# Patient Record
Sex: FEMALE | Race: WHITE | HISPANIC OR LATINO | Employment: FULL TIME | URBAN - METROPOLITAN AREA
[De-identification: names, ages, dates, MRNs, and addresses within clinical notes are randomized per-mention and may not be internally consistent; named-entity substitution may affect disease eponyms.]

---

## 2017-05-08 PROCEDURE — 99284 EMERGENCY DEPT VISIT MOD MDM: CPT

## 2017-05-08 ASSESSMENT — PAIN SCALES - GENERAL: PAINLEVEL_OUTOF10: 4

## 2017-05-09 ENCOUNTER — HOSPITAL ENCOUNTER (EMERGENCY)
Facility: MEDICAL CENTER | Age: 47
End: 2017-05-09
Attending: EMERGENCY MEDICINE
Payer: COMMERCIAL

## 2017-05-09 VITALS
OXYGEN SATURATION: 92 % | WEIGHT: 236.33 LBS | TEMPERATURE: 97.3 F | RESPIRATION RATE: 18 BRPM | SYSTOLIC BLOOD PRESSURE: 147 MMHG | HEIGHT: 67 IN | BODY MASS INDEX: 37.09 KG/M2 | HEART RATE: 90 BPM | DIASTOLIC BLOOD PRESSURE: 90 MMHG

## 2017-05-09 DIAGNOSIS — R07.89 ACUTE CHEST WALL PAIN: ICD-10-CM

## 2017-05-09 DIAGNOSIS — S16.1XXA NECK MUSCLE STRAIN, INITIAL ENCOUNTER: ICD-10-CM

## 2017-05-09 PROCEDURE — A9270 NON-COVERED ITEM OR SERVICE: HCPCS | Performed by: EMERGENCY MEDICINE

## 2017-05-09 PROCEDURE — 700102 HCHG RX REV CODE 250 W/ 637 OVERRIDE(OP): Performed by: EMERGENCY MEDICINE

## 2017-05-09 RX ORDER — DIAZEPAM 2 MG/1
2 TABLET ORAL ONCE
Status: COMPLETED | OUTPATIENT
Start: 2017-05-09 | End: 2017-05-09

## 2017-05-09 RX ORDER — IBUPROFEN 600 MG/1
600 TABLET ORAL ONCE
Status: COMPLETED | OUTPATIENT
Start: 2017-05-09 | End: 2017-05-09

## 2017-05-09 RX ORDER — TIZANIDINE 4 MG/1
4 TABLET ORAL 3 TIMES DAILY PRN
Qty: 16 TAB | Refills: 0 | Status: SHIPPED | OUTPATIENT
Start: 2017-05-09 | End: 2022-03-03

## 2017-05-09 RX ORDER — IBUPROFEN 800 MG/1
800 TABLET ORAL EVERY 8 HOURS PRN
Qty: 30 TAB | Refills: 0 | Status: SHIPPED | OUTPATIENT
Start: 2017-05-09 | End: 2022-05-17

## 2017-05-09 RX ADMIN — DIAZEPAM 2 MG: 2 TABLET ORAL at 02:46

## 2017-05-09 RX ADMIN — IBUPROFEN 600 MG: 600 TABLET, FILM COATED ORAL at 02:46

## 2017-05-09 NOTE — ED AVS SNAPSHOT
[employer] Access Code: Activation code not generated  Current [employer] Status: Active    MulliganPlushart  A secure, online tool to manage your health information     [employer]’s [employer]® is a secure, online tool that connects you to your personalized health information from the privacy of your home -- day or night - making it very easy for you to manage your healthcare. Once the activation process is completed, you can even access your medical information using the [employer] latisha, which is available for free in the Apple Latisha store or Google Play store.     [employer] provides the following levels of access (as shown below):   My Chart Features   [facility] Primary Care Doctor [facility]  Specialists [facility]  Urgent  Care Non-[facility]  Primary Care  Doctor   Email your healthcare team securely and privately 24/7 X X X X   Manage appointments: schedule your next appointment; view details of past/upcoming appointments X      Request prescription refills. X      View recent personal medical records, including lab and immunizations X X X X   View health record, including health history, allergies, medications X X X X   Read reports about your outpatient visits, procedures, consult and ER notes X X X X   See your discharge summary, which is a recap of your hospital and/or ER visit that includes your diagnosis, lab results, and care plan. X X       How to register for [employer]:  1. Go to  [email or web address].  2. Click on the Sign Up Now box, which takes you to the New Member Sign Up page. You will need to provide the following information:  a. Enter your [employer] Access Code exactly as it appears at the top of this page. (You will not need to use this code after you’ve completed the sign-up process. If you do not sign up before the expiration date, you must request a new code.)   b. Enter your date of birth.   c. Enter your home email address.   d. Click Submit, and follow the next screen’s instructions.  3. Create a [employer] ID. This will  be your GetYourGuide login ID and cannot be changed, so think of one that is secure and easy to remember.  4. Create a GetYourGuide password. You can change your password at any time.  5. Enter your Password Reset Question and Answer. This can be used at a later time if you forget your password.   6. Enter your e-mail address. This allows you to receive e-mail notifications when new information is available in GetYourGuide.  7. Click Sign Up. You can now view your health information.    For assistance activating your GetYourGuide account, call [phone]

## 2017-05-09 NOTE — ED AVS SNAPSHOT
Home Care Instructions                                                                                                                Willa Haywood   MRN: [id number]    Department:  [facility], Emergency Dept   Date of Visit:  5/8/2017            [facility], Emergency Dept    [address] [place] [address]    Phone:  [phone]      You were seen by     Sravani Spears M.D.      Your Diagnosis Was     Neck muscle strain, initial encounter     S16.1XXA       These are the medications you received during your hospitalization from 05/08/2017 2050 to 05/09/2017 0248     Date/Time Order Dose Route Action    05/09/2017 0246 ibuprofen (MOTRIN) tablet 600 mg 600 mg Oral Given    05/09/2017 0246 diazepam (VALIUM) tablet 2 mg 2 mg Oral Given      Follow-up Information     1. Follow up with Xavier Kincaid M.D.. Call today.    Specialty:  Family Medicine    Why:  for re-check later this week, possible referral to physical therapy    Contact information    [address] [facility] [address]  [phone]        Medication Information     Review all of your home medications and newly ordered medications with your primary doctor and/or pharmacist as soon as possible. Follow medication instructions as directed by your doctor and/or pharmacist.     Please keep your complete medication list with you and share with your physician. Update the information when medications are discontinued, doses are changed, or new medications (including over-the-counter products) are added; and carry medication information at all times in the event of emergency situations.               Medication List      START taking these medications        Instructions    Morning Afternoon Evening Bedtime    ibuprofen 800 MG Tabs   Last time this was given:  600 mg on 5/9/2017  2:46 AM   Commonly known as:  MOTRIN        Take 1 Tab by mouth every 8 hours as needed for Moderate Pain.   Dose:  800 mg                       tizanidine 4 MG Tabs   Commonly known as:  ZANAFLEX        Take 1 Tab by mouth 3 times a day as needed (muscle spasms).   Dose:  4 mg                          ASK your doctor about these medications        Instructions    Morning Afternoon Evening Bedtime    amlodipine 5 MG Tabs   Commonly known as:  NORVASC        TAKE ONE TABLET BY MOUTH ONE TIME DAILY                        ASCENSIA MICROFILL TEST strip   Generic drug:  glucose blood        TEST 3 X DAILY AS DIRECTED                        aspirin  MG Tbec   Commonly known as:  ECOTRIN        Take 325 mg by mouth every 48 hours.   Dose:  325 mg                        Canagliflozin 100 MG Tabs   Commonly known as:  INVOKANA        Take 1 Tab by mouth every day.   Dose:  1 Tab                        cholecalciferol 400 UNIT Caps   Commonly known as:  VITAMIN D        Take 800 Units by mouth every day.   Dose:  800 Units                        fish oil 1000 MG Caps capsule        Take 4,000 mg by mouth every morning. FISH OIL /OTC   Dose:  4000 mg                        glimepiride 2 MG Tabs   Commonly known as:  AMARYL        Take 1 Tab by mouth 2 times a day.   Dose:  2 mg                        hydrochlorothiazide 25 MG Tabs   Commonly known as:  HYDRODIURIL        Take 1 Tab by mouth every day.   Dose:  25 mg                        LORATADINE        10 mg by Does not apply route every day. PRN / OTC   Dose:  10 mg                        losartan 100 MG Tabs   Commonly known as:  COZAAR        TAKE ONE TABLET BY MOUTH ONE TIME DAILY                        * metformin  MG Tb24   Commonly known as:  GLUCOPHAGE XR        TAKE TWO TABLETS BY MOUTH TWICE DAILY                        * metformin  MG Tb24   Commonly known as:  GLUCOPHAGE XR        Take 2 Tabs by mouth 2 times a day.   Dose:  1000 mg                        Red Yeast Rice 600 MG Tabs        Take  by mouth 2 Times a Day.                        * Notice:  This list has 2  medication(s) that are the same as other medications prescribed for you. Read the directions carefully, and ask your doctor or other care provider to review them with you.         Where to Get Your Medications      These medications were sent to [facility] - [place] - [address]  [address] [place] [address]     Phone:  [phone]    - tizanidine 4 MG Tabs      You can get these medications from any pharmacy     Bring a paper prescription for each of these medications    - ibuprofen 800 MG Tabs              Discharge Instructions       Soft Tissue Injury of the Neck    Please follow up with a primary physician for blood pressure management, diabetic screening, and all other preventive health concerns.      A soft tissue injury of the neck needs medical care right away. These injuries are often caused by a direct hit to the neck. Some injuries do not break the skin (blunt injury). Some injuries do break the skin (penetrating injury) and create an open wound. You may feel fine at first, but the puffiness (swelling) in your throat can slowly make it harder to breathe. This could cause serious or life-threatening injury. There could be damage to major blood vessels and nerves in the neck. Neck injuries need to be checked by a doctor.  HOME CARE  · If the skin was broken, keep the area clean and dry. Care for your wound as told by your doctor.  · Follow your doctor's diet advice.  · Follow your doctor's advice about using your voice.  · Only take medicines as told by your doctor.  · Keep your head and neck raised (elevated). Do this while you sleep, too.  GET HELP RIGHT AWAY IF:  · Your voice gets weaker.  · Your puffiness or bruising does not get better.  · You have problems with your medicines.  · You see fluid coming from the wound.  · Your pain gets worse, or you have trouble swallowing.  · You cough up blood.  · You have trouble breathing.  · You start to drool.  · You start throwing up  (vomiting).  · You have new puffiness in the neck or face.  · You have a temperature by mouth above 102° F (38.9° C), not controlled by medicine.  MAKE SURE YOU:  · Understand these instructions.  · Will watch your condition.  · Will get help right away if you are not doing well or get worse.     This information is not intended to replace advice given to you by your health care provider. Make sure you discuss any questions you have with your health care provider.     Document Released: 03/29/2012 Document Revised: 03/11/2013 Document Reviewed: 03/29/2012  [employer] Interactive Patient Education ©2016 [employer]            Patient Information     Patient Information    Following emergency treatment: all patient requiring follow-up care must return either to a private physician or a clinic if your condition worsens before you are able to obtain further medical attention, please return to the emergency room.     Billing Information    At [facility], we work to make the billing process streamlined for our patients.  Our Representatives are here to answer any questions you may have regarding your hospital bill.  If you have insurance coverage and have supplied your insurance information to us, we will submit a claim to your insurer on your behalf.  Should you have any questions regarding your bill, we can be reached online or by phone as follows:  Online: You are able pay your bills online or live chat with our representatives about any billing questions you may have. We are here to help Monday - Friday from 8:00am to 7:30pm and 9:00am - 12:00pm on Saturdays.  Please visit [email or web address]  for more information.   Phone:  [phone] or [phone]    Please note that your emergency physician, surgeon, pathologist, radiologist, anesthesiologist, and other specialists are not employed by [facility] and will therefore bill separately for their services.  Please contact them  directly for any questions concerning their bills at the numbers below:     Emergency Physician Services:  [phone]  [facility]:  [phone]  [facility]:  [phone]  [facility]:  [phone]    1. Your final bill may vary from the amount quoted upon discharge if all procedures are not complete at that time, or if your doctor has additional procedures of which we are not aware. You will receive an additional bill if you return to the Emergency Department at [facility] for suture removal regardless of the facility of which the sutures were placed.     2. Please arrange for settlement of this account at the emergency registration.    3. All self-pay accounts are due in full at the time of treatment.  If you are unable to meet this obligation then payment is expected within 4-5 days.     4. If you have had radiology studies (CT, X-ray, Ultrasound, MRI), you have received a preliminary result during your emergency department visit. Please contact the radiology department [phone] to receive a copy of your final result. Please discuss the Final result with your primary physician or with the follow up physician provided.     Crisis Hotline:  [place] Crisis Hotline:  1-939-SYVLIWP or 1-918.436.7998  [place] Crisis Hotline:    1-812.445.3387 or 934-105-9555         ED Discharge Follow Up Questions    1. In order to provide you with very good care, we would like to follow up with a phone call in the next few days.  May we have your permission to contact you?     YES /  NO    2. What is the best phone number to call you? (       )_____-__________    3. What is the best time to call you?      Morning  /  Afternoon  /  Evening                   Patient Signature:  ____________________________________________________________    Date:  ____________________________________________________________

## 2017-05-09 NOTE — ED NOTES
Pt discharged home,  still being seen so she will go to his room and a friend will be picking them up.  No IV in place and 2 prescriptions given to have filled
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
## 2017-05-09 NOTE — DISCHARGE INSTRUCTIONS
Soft Tissue Injury of the Neck    Please follow up with a primary physician for blood pressure management, diabetic screening, and all other preventive health concerns.      A soft tissue injury of the neck needs medical care right away. These injuries are often caused by a direct hit to the neck. Some injuries do not break the skin (blunt injury). Some injuries do break the skin (penetrating injury) and create an open wound. You may feel fine at first, but the puffiness (swelling) in your throat can slowly make it harder to breathe. This could cause serious or life-threatening injury. There could be damage to major blood vessels and nerves in the neck. Neck injuries need to be checked by a doctor.  HOME CARE  · If the skin was broken, keep the area clean and dry. Care for your wound as told by your doctor.  · Follow your doctor's diet advice.  · Follow your doctor's advice about using your voice.  · Only take medicines as told by your doctor.  · Keep your head and neck raised (elevated). Do this while you sleep, too.  GET HELP RIGHT AWAY IF:  · Your voice gets weaker.  · Your puffiness or bruising does not get better.  · You have problems with your medicines.  · You see fluid coming from the wound.  · Your pain gets worse, or you have trouble swallowing.  · You cough up blood.  · You have trouble breathing.  · You start to drool.  · You start throwing up (vomiting).  · You have new puffiness in the neck or face.  · You have a temperature by mouth above 102° F (38.9° C), not controlled by medicine.  MAKE SURE YOU:  · Understand these instructions.  · Will watch your condition.  · Will get help right away if you are not doing well or get worse.     This information is not intended to replace advice given to you by your health care provider. Make sure you discuss any questions you have with your health care provider.     Document Released: 03/29/2012 Document Revised: 03/11/2013 Document Reviewed: 03/29/2012  [employer]  Interactive Patient Education ©2016 [employer] Inc.
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
## 2017-05-09 NOTE — ED AVS SNAPSHOT
5/9/2017    Willa Alejandro Slemp  [address] [place] [address]    Dear Willa:    [facility] wants to ensure your discharge home is safe and you or your loved ones have had all of your questions answered regarding your care after you leave the hospital.    Below is a list of resources and contact information should you have any questions regarding your hospital stay, follow-up instructions, or active medical symptoms.    Questions or Concerns Regarding… Contact   Medical Questions Related to Your Discharge  (7 days a week, 8am-5pm) Contact a Nurse Care Coordinator   [phone]   Medical Questions Not Related to Your Discharge  (24 hours a day / 7 days a week)  Contact the Nurse Health Line   [phone]    Medications or Discharge Instructions Refer to your discharge packet   or contact your [facility] Primary Care Provider   [phone]   Follow-up Appointment(s) Schedule your appointment via [employer]ageCloud   or contact Scheduling [phone]   Billing Review your statement via SageCloud  or contact Billing [phone]   Medical Records Review your records via SageCloud   or contact Medical Records [phone]     You may receive a telephone call within two days of discharge. This call is to make certain you understand your discharge instructions and have the opportunity to have any questions answered. You can also easily access your medical information, test results and upcoming appointments via the SageCloud free online health management tool. You can learn more and sign up at [facility]/SageCloud. For assistance setting up your SageCloud account, please call [phone].    Once again, we want to ensure your discharge home is safe and that you have a clear understanding of any next steps in your care. If you have any questions or concerns, please do not hesitate to contact us, we are here for you. Thank you for choosing [facility] for your healthcare needs.    Sincerely,    Your [facility] Healthcare Team
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
## 2017-10-02 ENCOUNTER — HOSPITAL ENCOUNTER (OUTPATIENT)
Dept: LAB | Facility: MEDICAL CENTER | Age: 47
End: 2017-10-02
Attending: CHIROPRACTOR
Payer: COMMERCIAL

## 2017-10-02 LAB
ALBUMIN SERPL BCP-MCNC: 4 G/DL (ref 3.2–4.9)
ALBUMIN/GLOB SERPL: 1.3 G/DL
ALP SERPL-CCNC: 51 U/L (ref 30–99)
ALT SERPL-CCNC: 37 U/L (ref 2–50)
ANION GAP SERPL CALC-SCNC: 14 MMOL/L (ref 0–11.9)
APPEARANCE UR: CLEAR
AST SERPL-CCNC: 20 U/L (ref 12–45)
BACTERIA #/AREA URNS HPF: ABNORMAL /HPF
BILIRUB SERPL-MCNC: 0.5 MG/DL (ref 0.1–1.5)
BILIRUB UR QL STRIP.AUTO: NEGATIVE
BUN SERPL-MCNC: 13 MG/DL (ref 8–22)
CALCIUM SERPL-MCNC: 9.5 MG/DL (ref 8.5–10.5)
CHLORIDE SERPL-SCNC: 99 MMOL/L (ref 96–112)
CHOLEST SERPL-MCNC: 189 MG/DL (ref 100–199)
CO2 SERPL-SCNC: 21 MMOL/L (ref 20–33)
COLOR UR: ABNORMAL
CREAT SERPL-MCNC: 0.6 MG/DL (ref 0.5–1.4)
CREAT UR-MCNC: 53.8 MG/DL
EPI CELLS #/AREA URNS HPF: ABNORMAL /HPF
ERYTHROCYTE [DISTWIDTH] IN BLOOD BY AUTOMATED COUNT: 39.3 FL (ref 35.9–50)
EST. AVERAGE GLUCOSE BLD GHB EST-MCNC: 289 MG/DL
GFR SERPL CREATININE-BSD FRML MDRD: >60 ML/MIN/1.73 M 2
GLOBULIN SER CALC-MCNC: 3.1 G/DL (ref 1.9–3.5)
GLUCOSE SERPL-MCNC: 248 MG/DL (ref 65–99)
GLUCOSE UR STRIP.AUTO-MCNC: 500 MG/DL
HBA1C MFR BLD: 11.7 % (ref 0–5.6)
HCT VFR BLD AUTO: 46.2 % (ref 37–47)
HDLC SERPL-MCNC: 30 MG/DL
HGB BLD-MCNC: 15.8 G/DL (ref 12–16)
HYALINE CASTS #/AREA URNS LPF: ABNORMAL /LPF
KETONES UR STRIP.AUTO-MCNC: 100 MG/DL
LDLC SERPL CALC-MCNC: ABNORMAL MG/DL
LEUKOCYTE ESTERASE UR QL STRIP.AUTO: NEGATIVE
MCH RBC QN AUTO: 30.4 PG (ref 27–33)
MCHC RBC AUTO-ENTMCNC: 34.2 G/DL (ref 33.6–35)
MCV RBC AUTO: 88.8 FL (ref 81.4–97.8)
MICRO URNS: ABNORMAL
MICROALBUMIN UR-MCNC: 14.8 MG/DL
MICROALBUMIN/CREAT UR: 275 MG/G (ref 0–30)
NITRITE UR QL STRIP.AUTO: NEGATIVE
PH UR STRIP.AUTO: 6 [PH]
PLATELET # BLD AUTO: 309 K/UL (ref 164–446)
PMV BLD AUTO: 10.4 FL (ref 9–12.9)
POTASSIUM SERPL-SCNC: 3.8 MMOL/L (ref 3.6–5.5)
PROT SERPL-MCNC: 7.1 G/DL (ref 6–8.2)
PROT UR QL STRIP: 30 MG/DL
RBC # BLD AUTO: 5.2 M/UL (ref 4.2–5.4)
RBC # URNS HPF: ABNORMAL /HPF
RBC UR QL AUTO: NEGATIVE
SODIUM SERPL-SCNC: 134 MMOL/L (ref 135–145)
SP GR UR STRIP.AUTO: 1.01
TRIGL SERPL-MCNC: 881 MG/DL (ref 0–149)
TSH SERPL DL<=0.005 MIU/L-ACNC: 2.86 UIU/ML (ref 0.3–3.7)
UROBILINOGEN UR STRIP.AUTO-MCNC: NORMAL MG/DL
WBC # BLD AUTO: 7 K/UL (ref 4.8–10.8)
WBC #/AREA URNS HPF: ABNORMAL /HPF

## 2017-10-02 PROCEDURE — 81001 URINALYSIS AUTO W/SCOPE: CPT

## 2017-10-02 PROCEDURE — 82043 UR ALBUMIN QUANTITATIVE: CPT

## 2017-10-02 PROCEDURE — 36415 COLL VENOUS BLD VENIPUNCTURE: CPT

## 2017-10-02 PROCEDURE — 84443 ASSAY THYROID STIM HORMONE: CPT

## 2017-10-02 PROCEDURE — 80053 COMPREHEN METABOLIC PANEL: CPT

## 2017-10-02 PROCEDURE — 83036 HEMOGLOBIN GLYCOSYLATED A1C: CPT

## 2017-10-02 PROCEDURE — 80061 LIPID PANEL: CPT

## 2017-10-02 PROCEDURE — 82570 ASSAY OF URINE CREATININE: CPT

## 2017-10-02 PROCEDURE — 85027 COMPLETE CBC AUTOMATED: CPT

## 2018-08-06 ENCOUNTER — HOSPITAL ENCOUNTER (OUTPATIENT)
Dept: LAB | Facility: MEDICAL CENTER | Age: 48
End: 2018-08-06
Attending: NURSE PRACTITIONER
Payer: COMMERCIAL

## 2018-08-06 LAB
ALBUMIN SERPL BCP-MCNC: 4.4 G/DL (ref 3.2–4.9)
ALBUMIN/GLOB SERPL: 1.7 G/DL
ALP SERPL-CCNC: 42 U/L (ref 30–99)
ALT SERPL-CCNC: 27 U/L (ref 2–50)
ANION GAP SERPL CALC-SCNC: 8 MMOL/L (ref 0–11.9)
AST SERPL-CCNC: 15 U/L (ref 12–45)
BILIRUB SERPL-MCNC: 0.3 MG/DL (ref 0.1–1.5)
BUN SERPL-MCNC: 15 MG/DL (ref 8–22)
CALCIUM SERPL-MCNC: 9.9 MG/DL (ref 8.5–10.5)
CHLORIDE SERPL-SCNC: 100 MMOL/L (ref 96–112)
CHOLEST SERPL-MCNC: 171 MG/DL (ref 100–199)
CO2 SERPL-SCNC: 28 MMOL/L (ref 20–33)
CREAT SERPL-MCNC: 0.68 MG/DL (ref 0.5–1.4)
CREAT UR-MCNC: 71.3 MG/DL
EST. AVERAGE GLUCOSE BLD GHB EST-MCNC: 249 MG/DL
GLOBULIN SER CALC-MCNC: 2.6 G/DL (ref 1.9–3.5)
GLUCOSE SERPL-MCNC: 232 MG/DL (ref 65–99)
HBA1C MFR BLD: 10.3 % (ref 0–5.6)
HDLC SERPL-MCNC: 35 MG/DL
LDLC SERPL CALC-MCNC: 88 MG/DL
MICROALBUMIN UR-MCNC: 1.5 MG/DL
MICROALBUMIN/CREAT UR: 21 MG/G (ref 0–30)
POTASSIUM SERPL-SCNC: 4.5 MMOL/L (ref 3.6–5.5)
PROT SERPL-MCNC: 7 G/DL (ref 6–8.2)
SODIUM SERPL-SCNC: 136 MMOL/L (ref 135–145)
TRIGL SERPL-MCNC: 240 MG/DL (ref 0–149)
TSH SERPL DL<=0.005 MIU/L-ACNC: 1.6 UIU/ML (ref 0.38–5.33)

## 2018-08-06 PROCEDURE — 80061 LIPID PANEL: CPT

## 2018-08-06 PROCEDURE — 84443 ASSAY THYROID STIM HORMONE: CPT

## 2018-08-06 PROCEDURE — 36415 COLL VENOUS BLD VENIPUNCTURE: CPT

## 2018-08-06 PROCEDURE — 82043 UR ALBUMIN QUANTITATIVE: CPT

## 2018-08-06 PROCEDURE — 80053 COMPREHEN METABOLIC PANEL: CPT

## 2018-08-06 PROCEDURE — 83036 HEMOGLOBIN GLYCOSYLATED A1C: CPT

## 2018-08-06 PROCEDURE — 82570 ASSAY OF URINE CREATININE: CPT

## 2020-01-10 ENCOUNTER — HOSPITAL ENCOUNTER (OUTPATIENT)
Dept: LAB | Facility: MEDICAL CENTER | Age: 50
End: 2020-01-10
Attending: FAMILY MEDICINE
Payer: COMMERCIAL

## 2020-01-10 LAB
ANION GAP SERPL CALC-SCNC: 13 MMOL/L (ref 0–11.9)
BUN SERPL-MCNC: 15 MG/DL (ref 8–22)
CALCIUM SERPL-MCNC: 9.4 MG/DL (ref 8.5–10.5)
CHLORIDE SERPL-SCNC: 102 MMOL/L (ref 96–112)
CHOLEST SERPL-MCNC: 208 MG/DL (ref 100–199)
CO2 SERPL-SCNC: 23 MMOL/L (ref 20–33)
CREAT SERPL-MCNC: 0.53 MG/DL (ref 0.5–1.4)
CREAT UR-MCNC: 48.7 MG/DL
GLUCOSE SERPL-MCNC: 191 MG/DL (ref 65–99)
HDLC SERPL-MCNC: 40 MG/DL
LDLC SERPL CALC-MCNC: 138 MG/DL
MICROALBUMIN UR-MCNC: 5.2 MG/DL
MICROALBUMIN/CREAT UR: 107 MG/G (ref 0–30)
POTASSIUM SERPL-SCNC: 3.8 MMOL/L (ref 3.6–5.5)
SODIUM SERPL-SCNC: 138 MMOL/L (ref 135–145)
TRIGL SERPL-MCNC: 149 MG/DL (ref 0–149)

## 2020-01-10 PROCEDURE — 82043 UR ALBUMIN QUANTITATIVE: CPT

## 2020-01-10 PROCEDURE — 80061 LIPID PANEL: CPT

## 2020-01-10 PROCEDURE — 84681 ASSAY OF C-PEPTIDE: CPT

## 2020-01-10 PROCEDURE — 83721 ASSAY OF BLOOD LIPOPROTEIN: CPT

## 2020-01-10 PROCEDURE — 82570 ASSAY OF URINE CREATININE: CPT

## 2020-01-10 PROCEDURE — 80048 BASIC METABOLIC PNL TOTAL CA: CPT

## 2020-01-10 PROCEDURE — 36415 COLL VENOUS BLD VENIPUNCTURE: CPT

## 2020-01-11 LAB — C PEPTIDE SERPL-MCNC: 2.5 NG/ML (ref 0.8–3.5)

## 2020-01-12 LAB — LDLC SERPL-MCNC: 152 MG/DL (ref 0–129)

## 2020-09-03 ENCOUNTER — OFFICE VISIT (OUTPATIENT)
Dept: URGENT CARE | Facility: PHYSICIAN GROUP | Age: 50
End: 2020-09-03
Payer: COMMERCIAL

## 2020-09-03 VITALS
BODY MASS INDEX: 34.21 KG/M2 | HEIGHT: 67 IN | OXYGEN SATURATION: 95 % | TEMPERATURE: 96.8 F | WEIGHT: 218 LBS | RESPIRATION RATE: 12 BRPM | DIASTOLIC BLOOD PRESSURE: 86 MMHG | HEART RATE: 90 BPM | SYSTOLIC BLOOD PRESSURE: 140 MMHG

## 2020-09-03 DIAGNOSIS — S61.214A LACERATION OF RIGHT RING FINGER WITHOUT FOREIGN BODY WITHOUT DAMAGE TO NAIL, INITIAL ENCOUNTER: ICD-10-CM

## 2020-09-03 PROCEDURE — 12002 RPR S/N/AX/GEN/TRNK2.6-7.5CM: CPT | Performed by: FAMILY MEDICINE

## 2020-09-03 PROCEDURE — 90471 IMMUNIZATION ADMIN: CPT | Performed by: FAMILY MEDICINE

## 2020-09-03 PROCEDURE — 90715 TDAP VACCINE 7 YRS/> IM: CPT | Performed by: FAMILY MEDICINE

## 2020-09-03 RX ORDER — GEMFIBROZIL 600 MG/1
TABLET, FILM COATED ORAL
COMMUNITY
Start: 2018-08-02 | End: 2022-09-08 | Stop reason: SDUPTHER

## 2020-09-03 RX ORDER — GLIMEPIRIDE 2 MG/1
TABLET ORAL
COMMUNITY
Start: 2018-08-02 | End: 2021-12-29

## 2020-09-03 RX ORDER — AMLODIPINE BESYLATE 5 MG/1
TABLET ORAL
COMMUNITY
Start: 2018-08-02 | End: 2021-12-29

## 2020-09-03 RX ORDER — METFORMIN HYDROCHLORIDE 500 MG/1
TABLET, EXTENDED RELEASE ORAL
COMMUNITY
Start: 2018-08-02 | End: 2022-03-03

## 2020-09-03 RX ORDER — EMPAGLIFLOZIN 25 MG/1
TABLET, FILM COATED ORAL
COMMUNITY
Start: 2018-08-02 | End: 2022-08-18 | Stop reason: SDUPTHER

## 2020-09-03 RX ORDER — LOSARTAN POTASSIUM AND HYDROCHLOROTHIAZIDE 25; 100 MG/1; MG/1
TABLET ORAL
COMMUNITY
Start: 2018-08-02 | End: 2021-12-29

## 2020-09-03 RX ORDER — EXENATIDE 2 MG/.65ML
INJECTION, SUSPENSION, EXTENDED RELEASE SUBCUTANEOUS
COMMUNITY
Start: 2020-07-02 | End: 2022-05-27

## 2020-09-04 NOTE — PROGRESS NOTES
Subjective:      Chief Complaint   Patient presents with   • Laceration     R hand pinky and ring finger injury, pt was catching glass cup but the edge cut her fingers, onset today at 5:20pm                 Laceration   The incident occurred less than 1 hour ago.  location: rt pinky and rt ring finger.    The laceration mechanism was a glass edge. The pain is mild. The pain has been constant since onset.  tetanus status is: not current        Social History     Tobacco Use   • Smoking status: Former Smoker   • Smokeless tobacco: Never Used   • Tobacco comment: TEEN   Substance Use Topics   • Alcohol use: Yes     Comment: RARELY   • Drug use: No           Past Medical History:   Diagnosis Date   • H/O: hysterectomy 6/26/2009   • Hypercholesterolemia 6/26/2009   • Hypertension 6/26/2009   • Morbid obesity (HCC) 6/26/2009   • Polycystic ovarian syndrome 6/26/2009   • S/P partial hysterectomy 6/26/2009   • Type II or unspecified type diabetes mellitus without mention of complication, uncontrolled 6/26/2009         Current Outpatient Medications on File Prior to Visit   Medication Sig Dispense Refill   • metFORMIN ER (GLUCOPHAGE XR) 500 MG TABLET SR 24 HR METFORMIN HCL  MG XR24H-TAB     • glimepiride (AMARYL) 2 MG Tab GLIMEPIRIDE 2 MG TABS     • amLODIPine (NORVASC) 5 MG Tab AMLODIPINE BESYLATE 5 MG TABS     • losartan-hydrochlorothiazide (HYZAAR) 100-25 MG per tablet LOSARTAN POTASSIUM-HCTZ 100-25 MG TABS     • gemfibrozil (LOPID) 600 MG Tab GEMFIBROZIL 600 MG TABS     • Exenatide ER (BYDUREON) 2 MG Pen-injector BYDUREON 2 MG PEN     • Empagliflozin (JARDIANCE) 25 MG Tab JARDIANCE 25 MG TABS     • tizanidine (ZANAFLEX) 4 MG Tab Take 1 Tab by mouth 3 times a day as needed (muscle spasms). 16 Tab 0   • ibuprofen (MOTRIN) 800 MG Tab Take 1 Tab by mouth every 8 hours as needed for Moderate Pain. 30 Tab 0   • losartan (COZAAR) 100 MG Tab TAKE ONE TABLET BY MOUTH ONE TIME DAILY 90 Tab 2   • amlodipine (NORVASC) 5 MG Tab  "TAKE ONE TABLET BY MOUTH ONE TIME DAILY 90 Tab 2   • glimepiride (AMARYL) 2 MG Tab Take 1 Tab by mouth 2 times a day. 180 Tab 3   • metformin ER (GLUCOPHAGE XR) 500 MG TABLET SR 24 HR TAKE TWO TABLETS BY MOUTH TWICE DAILY 360 Tab 2   • metformin ER (GLUCOPHAGE XR) 500 MG TABLET SR 24 HR Take 2 Tabs by mouth 2 times a day. 360 Tab 3   • hydrochlorothiazide (HYDRODIURIL) 25 MG Tab Take 1 Tab by mouth every day. 90 Tab 3   • Canagliflozin (INVOKANA) 100 MG Tab Take 1 Tab by mouth every day. 30 Tab 11   • aspirin EC (ECOTRIN) 325 MG TBEC Take 325 mg by mouth every 48 hours.     • cholecalciferol (VITAMIN D) 400 UNIT CAPS Take 800 Units by mouth every day.     • ASCENSIA MICROFILL TEST strip TEST 3 X DAILY AS DIRECTED 100 Each 6   • Red Yeast Rice 600 MG TABS Take  by mouth 2 Times a Day.     • LORATADINE 10 mg by Does not apply route every day. PRN / OTC     • FISH OIL 1000 MG PO CAPS Take 4,000 mg by mouth every morning. FISH OIL /OTC       No current facility-administered medications on file prior to visit.          Review of Systems   Cardio - denies chest pain  resp - denies SOB.   Neurological: Negative for tingling, sensory change and focal weakness.   All other systems reviewed and are negative.         Objective:     /86 (BP Location: Right arm, Patient Position: Sitting, BP Cuff Size: Adult)   Pulse 90   Temp 36 °C (96.8 °F) (Temporal)   Resp 12   Ht 1.689 m (5' 6.5\")   Wt 98.9 kg (218 lb)   SpO2 95%       Physical Exam   Constitutional: pt is oriented to person, place, and time. Pt appears well-developed. No distress.   HENT:   Head: Normocephalic and atraumatic.   Eyes: Conjunctivae are normal.   Cardiovascular: Normal rate.    Pulmonary/Chest: Effort normal.   Musculoskeletal:   Pt exhibits rt hand:   5th digit- there is 3cm transverse lac over proximal finger, palmar aspect.   No foreign bodies or tendon injury.    4th digit- there is 2.6 cm transverse lac over proximal finger, palmar aspect.   " No foreign bodies or tendon injury.    normal range of motion and normal capillary refill. Normal sensation noted. Normal strength noted.           Neurological: she is alert and oriented to person, place, and time.   Skin: Skin is warm. Pt is not diaphoretic. No erythema.   Psychiatric:  behavior is normal.   Nursing note and vitals reviewed.              Assessment/Plan:          1. Laceration of right ring finger without foreign body without damage to nail, initial encounter     - Tdap =>8yo IM          The wounds were thoroughly irrigated with copious amount of normal saline.   Area was then prepped in the usual sterile fashion.    Local field block was obtained with 2% Lidocaine without Epinephrine.    Wounds were cleansed and debrided of as much devitalized tissue as possible.  Wound explored and found to be relatively superficial and no foreign bodies appreciated.  I approximated the wound edges and closed the laceration with  interrupted sutures of 5-0 ETHILON monofilament.  Area was then cleansed and dressed.   Wound care instructions and ER precautions given.   RTC in 7-10 d for wound check/suture removal.                 Tetanus vaccination given.

## 2020-09-14 ENCOUNTER — OFFICE VISIT (OUTPATIENT)
Dept: URGENT CARE | Facility: PHYSICIAN GROUP | Age: 50
End: 2020-09-14
Payer: COMMERCIAL

## 2020-09-14 VITALS
SYSTOLIC BLOOD PRESSURE: 130 MMHG | RESPIRATION RATE: 16 BRPM | HEIGHT: 67 IN | DIASTOLIC BLOOD PRESSURE: 80 MMHG | OXYGEN SATURATION: 94 % | BODY MASS INDEX: 34.5 KG/M2 | TEMPERATURE: 97.2 F | WEIGHT: 219.8 LBS | HEART RATE: 74 BPM

## 2020-09-14 DIAGNOSIS — Z48.02 VISIT FOR SUTURE REMOVAL: ICD-10-CM

## 2020-09-14 PROCEDURE — 99212 OFFICE O/P EST SF 10 MIN: CPT | Performed by: PHYSICIAN ASSISTANT

## 2020-09-14 ASSESSMENT — ENCOUNTER SYMPTOMS
TINGLING: 0
SENSORY CHANGE: 0
FEVER: 0
CHILLS: 0
FOCAL WEAKNESS: 0

## 2020-09-14 NOTE — PROGRESS NOTES
Subjective:   Willa Haywood is a 50 y.o. female who presents for Suture / Staple Removal (R hand pinky and ring finger lac, pt states that they feel a lot better. )      Suture / Staple Removal  The sutures were placed 11 to 14 days ago. She tried nothing since the wound repair. The treatment provided significant relief. Her temperature was unmeasured (No fever) prior to arrival. There has been no drainage from the wound. There is no redness present. There is no swelling present. There is no pain present. She has no difficulty moving the affected extremity or digit.   middle suture of pinky lac came out.     Review of Systems   Constitutional: Negative for chills and fever.   Skin:        Sutures in place of right fourth and fifth fingers.  No complications   Neurological: Negative for tingling, sensory change and focal weakness.       Medications:    • amLODIPine Tabs  • ASCENSIA MICROFILL TEST Strp  • aspirin EC Tbec  • Bydureon Pen  • Canagliflozin Tabs  • cholecalciferol Caps  • fish oil Caps  • gemfibrozil Tabs  • glimepiride Tabs  • hydroCHLOROthiazide Tabs  • ibuprofen Tabs  • Jardiance Tabs  • LORATADINE  • losartan Tabs  • losartan-hydrochlorothiazide  • metFORMIN ER Tb24  • Red Yeast Rice Tabs  • tizanidine Tabs    Allergies: Hmg-coa-r inhibitors, Enalapril, Lovastatin, and Lisinopril    Problem List: Willa Haywood has S/P partial hysterectomy; Type 2 DM, uncontrolled; Morbid obesity (HCC); Hypertension; Polycystic ovarian syndrome; Dyslipidemia; Vitamin D deficiency; and 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    Surgical History:  Past Surgical History:   Procedure Laterality Date   • KNEE ARTHROSCOPY  october 2013    right medial meniscus repair       Past Social Hx: Willa Haywood  reports that she has quit smoking. She has never used smokeless tobacco. She reports current alcohol use. She reports that she does not use drugs.  "    Past Family Hx:  Willa Haywood family history is not on file.     Problem list, medications, and allergies reviewed by myself today in Epic.     Objective:     /80 (BP Location: Right arm, Patient Position: Sitting, BP Cuff Size: Adult)   Pulse 74   Temp 36.2 °C (97.2 °F) (Temporal)   Resp 16   Ht 1.689 m (5' 6.5\")   Wt 99.7 kg (219 lb 12.8 oz)   LMP 01/09/2002   SpO2 94%   BMI 34.95 kg/m²     Physical Exam  Vitals signs reviewed.   Constitutional:       General: She is not in acute distress.     Appearance: Normal appearance. She is not ill-appearing or toxic-appearing.   Eyes:      Conjunctiva/sclera: Conjunctivae normal.      Pupils: Pupils are equal, round, and reactive to light.   Skin:     General: Skin is warm and dry.      Comments: rt hand:   5th digit- there is 3cm transverse healed lac over proximal finger, palmar aspect.  Two sutures in placed.   4th digit- there is 2.6 cm transverse healed lac over proximal finger, palmar aspect.   Two sutures in place.   normal range of motion and normal capillary refill. Normal sensation noted. Normal strength noted.    Neurological:      General: No focal deficit present.      Mental Status: She is alert and oriented to person, place, and time.   Psychiatric:         Mood and Affect: Mood normal.         Behavior: Behavior normal.         Assessment/Plan:     Diagnosis and associated orders:     1. Visit for suture removal        Comments/MDM:     • 4 sutures removed ( two from each finger). One suture was noted to come out on 5th barnhart side.   • No complications.   • Wound care discussed.            Supportive care, differential diagnoses, and indications for immediate follow-up discussed with patient.    Pathogenesis of diagnosis discussed including typical length and natural progression. Patient expresses understanding and agrees to plan.    Advised the patient to follow-up with the primary care physician for recheck, reevaluation, " and consideration of further management.    Please note that this dictation was created using voice recognition software. I have made a reasonable attempt to correct obvious errors, but I expect that there are errors of grammar and possibly content that I did not discover before finalizing the note.    This note was electronically signed by Victor Hugo Pinon PA-C

## 2021-12-29 ENCOUNTER — OCCUPATIONAL MEDICINE (OUTPATIENT)
Dept: MEDICAL GROUP | Facility: OTHER | Age: 51
End: 2021-12-29
Payer: COMMERCIAL

## 2021-12-29 ENCOUNTER — APPOINTMENT (OUTPATIENT)
Dept: RADIOLOGY | Facility: OTHER | Age: 51
End: 2021-12-29
Attending: FAMILY MEDICINE
Payer: COMMERCIAL

## 2021-12-29 VITALS
HEIGHT: 67 IN | WEIGHT: 210 LBS | RESPIRATION RATE: 15 BRPM | BODY MASS INDEX: 32.96 KG/M2 | TEMPERATURE: 97.3 F | HEART RATE: 82 BPM | SYSTOLIC BLOOD PRESSURE: 125 MMHG | OXYGEN SATURATION: 94 % | DIASTOLIC BLOOD PRESSURE: 80 MMHG

## 2021-12-29 DIAGNOSIS — R07.81 RIB PAIN ON LEFT SIDE: ICD-10-CM

## 2021-12-29 DIAGNOSIS — M79.18 PAIN IN LEFT BUTTOCK: ICD-10-CM
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
## 2021-12-29 PROBLEM — Z00.00 ENCOUNTER FOR GENERAL ADULT MEDICAL EXAMINATION WITHOUT ABNORMAL FINDINGS: Status: ACTIVE | Noted: 2018-08-02

## 2021-12-29 PROBLEM — S91.311A LACERATION WITHOUT FOREIGN BODY, RIGHT FOOT, INITIAL ENCOUNTER: Status: ACTIVE | Noted: 2019-08-01

## 2021-12-29 PROBLEM — B00.9 HERPES SIMPLEX INFECTION OF SKIN: Status: ACTIVE | Noted: 2021-07-13

## 2021-12-29 PROBLEM — M54.30 SCIATICA: Status: ACTIVE | Noted: 2018-08-02

## 2021-12-29 PROCEDURE — 99214 OFFICE O/P EST MOD 30 MIN: CPT | Performed by: FAMILY MEDICINE

## 2021-12-29 PROCEDURE — 71101 X-RAY EXAM UNILAT RIBS/CHEST: CPT | Mod: TC,FY,LT | Performed by: FAMILY MEDICINE

## 2021-12-29 RX ORDER — AMLODIPINE BESYLATE 5 MG/1
TABLET ORAL
COMMUNITY
Start: 2021-08-17 | End: 2021-12-29

## 2021-12-29 RX ORDER — EMPAGLIFLOZIN 25 MG/1
TABLET, FILM COATED ORAL
COMMUNITY
Start: 2021-08-17 | End: 2021-12-29

## 2021-12-29 NOTE — PROGRESS NOTES
[facility] MEDICINE     PATIENT ID:  NAME:  Willa Haywood  MRN:               [id number]  YOB: 1970    Date: 9:41 AM      CC:  Slipped on snow and ice      HPI: Willa Haywood is a 51 y.o. female who presented with a fall from slipping on ice or snow at work today.     She was walking into work after parking her car and  Slipped on some snow and ice she thinks.  Was wearing her usual back pack.  Complains of pain on her left side, buttock, and low back area.  Has no trouble walking now.      Sustained fall as detailed under slip on ice or snow.  Has pain localized to  Left  posterior lateral back and low back.  Likely bruised her butt area when she fell.  She is able to ambulate without difficulty.        REVIEW OF SYSTEMS:   Ten systems reviewed and were negative except as noted in the HPI.                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 No pertinent family history.    SOCIAL HISTORY:   Social History     Tobacco Use   • Smoking status: Former Smoker   • Smokeless tobacco: Never Used   • Tobacco comment: TEEN   Substance Use Topics   • Alcohol use: Yes     Comment: RARELY       ALLERGIES:  Allergies   Allergen Reactions   • Hmg-Coa-R Inhibitors      Other  reaction(s): leg cramps   • Other Drug      Other reaction(s): leg cramps   • Enalapril      cough   • Lovastatin      Leg cramps   • Lisinopril      Other reaction(s): cough, scratchy throat  Other reaction(s): cough, scratchy throat       OUTPATIENT MEDICATIONS:    Current Outpatient Medications:   •  metFORMIN ER (GLUCOPHAGE XR) 500 MG TABLET SR 24 HR, METFORMIN HCL  MG XR24H-TAB, Disp: , Rfl:   •  gemfibrozil (LOPID) 600 MG Tab, GEMFIBROZIL 600 MG TABS, Disp: , Rfl:   •  Exenatide ER (BYDUREON) 2 MG Pen-injector, BYDUREON 2 MG PEN, Disp: , Rfl:   •  Empagliflozin (JARDIANCE) 25 MG Tab, JARDIANCE 25 MG TABS, Disp: , Rfl:   •  losartan (COZAAR) 100 MG Tab, TAKE ONE TABLET BY MOUTH ONE TIME DAILY, Disp: 90 Tab, Rfl: 2  •  amlodipine (NORVASC) 5 MG Tab, TAKE ONE TABLET BY MOUTH ONE TIME DAILY, Disp: 90 Tab, Rfl: 2  •  glimepiride (AMARYL) 2 MG Tab, Take 1 Tab by mouth 2 times a day., Disp: 180 Tab, Rfl: 3  •  hydrochlorothiazide (HYDRODIURIL) 25 MG Tab, Take 1 Tab by mouth every day., Disp: 90 Tab, Rfl: 3  •  aspirin EC (ECOTRIN) 325 MG TBEC, Take 325 mg by mouth every 48 hours., Disp: , Rfl:   •  cholecalciferol (VITAMIN D) 400 UNIT CAPS, Take 800 Units by mouth every day., Disp: , Rfl:   •  ASCENSIA MICROFILL TEST strip, TEST 3 X DAILY AS DIRECTED, Disp: 100 Each, Rfl: 6  •  Red Yeast Rice 600 MG TABS, Take  by mouth 2 Times a Day., Disp: , Rfl:   •  LORATADINE, 10 mg by Does not apply route every day. PRN / OTC, Disp: , Rfl:   •  FISH OIL 1000 MG PO CAPS, Take 4,000 mg by mouth every morning. FISH OIL /OTC, Disp: , Rfl:   •  tizanidine (ZANAFLEX) 4 MG Tab, Take 1 Tab by mouth 3 times a day as needed (muscle spasms)., Disp: 16 Tab, Rfl: 0  •  ibuprofen (MOTRIN) 800 MG Tab, Take 1 Tab by mouth every 8 hours as needed for Moderate Pain., Disp: 30 Tab, Rfl: 0  •  metformin ER (GLUCOPHAGE XR) 500 MG TABLET SR 24 HR, TAKE TWO TABLETS BY MOUTH TWICE DAILY, Disp: 360 Tab, Rfl: 2  •  metformin ER (GLUCOPHAGE XR) 500  "MG TABLET SR 24 HR, Take 2 Tabs by mouth 2 times a day., Disp: 360 Tab, Rfl: 3  •  Canagliflozin (INVOKANA) 100 MG Tab, Take 1 Tab by mouth every day., Disp: 30 Tab, Rfl: 11    PHYSICAL EXAM:  Vitals:    12/29/21 0842   BP: 125/80   BP Location: Left arm   Patient Position: Sitting   BP Cuff Size: Adult   Pulse: 82   Resp: 15   Temp: 36.3 °C (97.3 °F)   TempSrc: Temporal   SpO2: 94%   Weight: 95.3 kg (210 lb)   Height: 1.689 m (5' 6.5\")       General: Pt resting in NAD, cooperative   Skin:  Pink, warm and dry.  HEENT: NC/AT. EOMI.  Lungs:  Symmetrical.  CTAB, good air movement   Extremities:  Full range of motion.  CNS:  Muscle tone is normal. No gross focal neurologic deficits      ASSESSMENT/PLAN:   51 y.o. female     Problem List Items Addressed This Visit     Fall due to slipping on ice or snow    Relevant Orders    [id number] (WITH 1-VIEW CXR) LEFT    Pain in left buttock     Likely a contusion from her fall.    Symptomatic therapy recommended.   Ibuprofen 600 mg as needed.          Rib pain on left side     Tender over left posterior lateral ribs; no crepitus palpated.   X-rays left posterior ribs.  Ibuprofen 600 mg q 6 hours as needed.   Ice or heat as needed for discomfort.   C4 filled out today.   Recheck one week.          Relevant Orders    [id number] (WITH 1-VIEW CXR) LEFT          Pool Bianchi MD  [employer] [facility]     "

## 2021-12-29 NOTE — PATIENT INSTRUCTIONS
Be careful out there!!!    Ibuprofen 600 mg every 6 hours as needed for pain/inflammation.     Ice or heat as needed for discomfort.      Recheck one week.     C4 form filled out.      Letter written for employer.

## 2021-12-29 NOTE — ASSESSMENT & PLAN NOTE
X-rays of ribs ordered. No obvious fracture noted.   NSAIDs for pain.   Ice or heat as needed for discomfort.   Recheck one week.   C4 form filled out for work comp.   Letter written for employer.

## 2021-12-29 NOTE — LETTER
December 29, 2021       Patient: Willa Haywood   YOB: 1970   Date of Visit: 12/29/2021         To Whom It May Concern:      Willa Haywood sustained fall on snow and ice today.      In my medical opinion, I recommend that she have the following work restrictions: no  running; can get up and stretch as needed.      If you have any questions or concerns, please don't hesitate to call [phone]          Sincerely,          Pool Bianchi M.D.  Electronically Signed

## 2021-12-29 NOTE — ASSESSMENT & PLAN NOTE
Tender over left posterior lateral ribs; no crepitus palpated.   X-rays left posterior ribs.  Ibuprofen 600 mg q 6 hours as needed.   Ice or heat as needed for discomfort.   C4 filled out today.   Recheck one week.

## 2021-12-29 NOTE — ASSESSMENT & PLAN NOTE
Likely a contusion from her fall.    Symptomatic therapy recommended.   Ibuprofen 600 mg as needed.

## 2022-01-07 ENCOUNTER — OCCUPATIONAL MEDICINE (OUTPATIENT)
Dept: OCCUPATIONAL MEDICINE | Facility: OTHER | Age: 52
End: 2022-01-07
Payer: COMMERCIAL

## 2022-01-07 VITALS
BODY MASS INDEX: 32.18 KG/M2 | HEART RATE: 89 BPM | DIASTOLIC BLOOD PRESSURE: 80 MMHG | RESPIRATION RATE: 14 BRPM | WEIGHT: 205 LBS | SYSTOLIC BLOOD PRESSURE: 115 MMHG | TEMPERATURE: 97.5 F | OXYGEN SATURATION: 94 % | HEIGHT: 67 IN
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
## 2022-01-07 DIAGNOSIS — S30.0XXD CONTUSION, BUTTOCK, SUBSEQUENT ENCOUNTER: ICD-10-CM

## 2022-01-07 DIAGNOSIS — S20.212D CHEST WALL CONTUSION, LEFT, SUBSEQUENT ENCOUNTER: ICD-10-CM

## 2022-01-07 PROCEDURE — 99214 OFFICE O/P EST MOD 30 MIN: CPT | Performed by: FAMILY MEDICINE

## 2022-01-07 ASSESSMENT — ENCOUNTER SYMPTOMS
SENSORY CHANGE: 0
CONSTITUTIONAL NEGATIVE: 1

## 2022-01-07 NOTE — LETTER
2022    Patient: Willa Haywood  : 1970  Provider: Xavier Kincaid M.D.    RETURN TO WORK    BODY PART: CONTUSIONS  EMPLOYER:[employer]  DOI: 21    It is the inured worker's responsibility to inform the employer of current work status.    CURRENT RESTRICTIONS: FULL DUTY    CONDITION STABLE: YES  CONDITION RATABLE: NO    RETURN VISIT: Return if symptoms worsen or fail to improve.    Electronically Signed: Xavier Kincaid M.D.

## 2022-01-07 NOTE — PROGRESS NOTES
" Subjective:   Willa Haywood is a 51 y.o. female here for the evaluation and management of Follow-Up (W/C BUTTOCKS /LT SIDE BACK )    Slip and fall on December 29, 2021 landing on her left side and chest wall.    Chest wall contusion-significant improvement with conservative care, no breathing complaints today, worse with coughing or sneezing    Left butt contusion-initial contusion with associated soft tissue swelling, gradually resolving with conservative care including stretching, no leg symptoms reported, no red flags  Problem   Chest Wall Contusion, Left, Subsequent Encounter   Contusion, Buttock, Subsequent Encounter       Review of Systems   Constitutional: Negative.    Neurological: Negative for sensory change.      Objective:     Vitals:    01/07/22 0945   BP: 115/80   BP Location: Right arm   Patient Position: Sitting   BP Cuff Size: Adult   Pulse: 89   Resp: 14   Temp: 36.4 °C (97.5 °F)   TempSrc: Temporal   SpO2: 94%   Weight: 93 kg (205 lb)   Height: 1.689 m (5' 6.5\")     Body mass index is 32.59 kg/m².     Physical Exam  Constitutional:       Appearance: Normal appearance.   HENT:      Head: Normocephalic.   Eyes:      Extraocular Movements: Extraocular movements intact.      Conjunctiva/sclera: Conjunctivae normal.   Cardiovascular:      Rate and Rhythm: Normal rate and regular rhythm.      Heart sounds: Normal heart sounds.   Pulmonary:      Effort: Pulmonary effort is normal.      Breath sounds: Normal breath sounds.   Skin:     General: Skin is warm and dry.   Neurological:      Mental Status: She is alert and oriented to person, place, and time. Mental status is at baseline.   Psychiatric:         Mood and Affect: Mood normal.         Behavior: Behavior normal.       Lumbar spine-normal inspection with no midline tenderness on palpation, mild soft tissue tenderness over the left but  Assessment and Plan:   Willa Haywood is a 51 y.o. female with a Follow-Up (W/C BUTTOCKS " /LT SIDE BACK )     Patient is healing with conservative care, she is using anti-inflammatories and exercise based rehabilitation including stretching, I expect her to heal without complication and we will follow-up as needed.    Problem List Items Addressed This Visit     Fall due to slipping on ice or snow    Chest wall contusion, left, subsequent encounter    Contusion, buttock, subsequent encounter          Followup: No follow-ups on file.    Xavier Kincaid M.D.    Please note that this dictation was created using voice recognition software. I have made every reasonable attempt to correct obvious errors, but I expect that there are errors of grammar and possibly content that I did not discover before finalizing the note.

## 2022-03-03 ENCOUNTER — OFFICE VISIT (OUTPATIENT)
Dept: MEDICAL GROUP | Facility: CLINIC | Age: 52
End: 2022-03-03
Payer: COMMERCIAL

## 2022-03-03 VITALS
HEART RATE: 77 BPM | WEIGHT: 210.6 LBS | DIASTOLIC BLOOD PRESSURE: 87 MMHG | TEMPERATURE: 97.7 F | BODY MASS INDEX: 33.06 KG/M2 | HEIGHT: 67 IN | OXYGEN SATURATION: 95 % | SYSTOLIC BLOOD PRESSURE: 133 MMHG

## 2022-03-03 DIAGNOSIS — E78.5 DYSLIPIDEMIA: ICD-10-CM

## 2022-03-03 DIAGNOSIS — E11.9 TYPE 2 DIABETES MELLITUS WITHOUT COMPLICATION, WITHOUT LONG-TERM CURRENT USE OF INSULIN (HCC): ICD-10-CM

## 2022-03-03 LAB
HBA1C MFR BLD: 10.3 % (ref 0–5.6)
INT CON NEG: ABNORMAL
INT CON POS: ABNORMAL

## 2022-03-03 PROCEDURE — 99214 OFFICE O/P EST MOD 30 MIN: CPT | Performed by: STUDENT IN AN ORGANIZED HEALTH CARE EDUCATION/TRAINING PROGRAM

## 2022-03-03 PROCEDURE — 83036 HEMOGLOBIN GLYCOSYLATED A1C: CPT | Performed by: STUDENT IN AN ORGANIZED HEALTH CARE EDUCATION/TRAINING PROGRAM

## 2022-03-03 RX ORDER — INSULIN GLARGINE 100 [IU]/ML
20 INJECTION, SOLUTION SUBCUTANEOUS EVERY EVENING
Qty: 10 EACH | Refills: 1 | Status: CANCELLED | OUTPATIENT
Start: 2022-03-03

## 2022-03-03 ASSESSMENT — ENCOUNTER SYMPTOMS
SENSORY CHANGE: 0
EYE REDNESS: 0
SHORTNESS OF BREATH: 0
COUGH: 0
VOMITING: 0
DIARRHEA: 0
FOCAL WEAKNESS: 0
FEVER: 0
BLURRED VISION: 0
ABDOMINAL PAIN: 0

## 2022-03-03 NOTE — PROGRESS NOTES
"Subjective:     CC: Diabetes follow up    HPI:   Willa presents today with    Problem   Dyslipidemia    Patient previously statins but could not tolerate due to severe myalgias.  Taking gemfibrozil and red rice yeast supplement  Has not had recent lipid panel checked       Type 2 diabetes mellitus without complications (HCC)    Diagnosed >20 years ago  Strong family history; patient's mother  from complications related to diabetes.  Current Meds:   Metformin ER 1,000 mg BID  Glimepiride 2 mg BID  Empagliflozin 25 mg daily  Exenatide ER 2 mg weekly  Lifestyle: Regular exercise program since 2021  Reports ketogenic diet  A1C 10.3% on 3/3/2022 (11.5% in 2021)  Ophtho exam: Last in , referral today  Monofilament exam: today  Lipid panel: last , cannot tolerate statins             ROS:  Review of Systems   Constitutional: Negative for fever.   Eyes: Negative for blurred vision and redness.   Respiratory: Negative for cough and shortness of breath.    Cardiovascular: Negative for chest pain and leg swelling.   Gastrointestinal: Negative for abdominal pain, diarrhea and vomiting.   Neurological: Negative for sensory change and focal weakness.     Past medical and surgical histories reviewed during visit    Family history significant for diabetes in multiple family members    Objective:     Exam:  /87 (BP Location: Left arm, Patient Position: Sitting, BP Cuff Size: Adult)   Pulse 77   Temp 36.5 °C (97.7 °F)   Ht 1.702 m (5' 7\")   Wt 95.5 kg (210 lb 9.6 oz)   LMP 2002   SpO2 95%   BMI 32.98 kg/m²  Body mass index is 32.98 kg/m².    Physical Exam  Constitutional:       General: She is not in acute distress.     Appearance: Normal appearance.   HENT:      Head: Normocephalic and atraumatic.      Right Ear: External ear normal.      Left Ear: External ear normal.   Eyes:      Conjunctiva/sclera: Conjunctivae normal.   Cardiovascular:      Rate and Rhythm: Normal rate and regular " rhythm.      Pulses: Normal pulses.           Dorsalis pedis pulses are 2+ on the right side and 2+ on the left side.        Posterior tibial pulses are 2+ on the right side and 2+ on the left side.      Heart sounds: Normal heart sounds.   Pulmonary:      Effort: Pulmonary effort is normal. No respiratory distress.      Breath sounds: Normal breath sounds. No wheezing or rhonchi.   Abdominal:      General: There is no distension.      Palpations: Abdomen is soft.   Musculoskeletal:         General: No swelling, deformity or signs of injury.   Feet:      Right foot:      Protective Sensation: 4 sites tested. 3 sites sensed.      Skin integrity: Skin integrity normal. No ulcer or skin breakdown.      Toenail Condition: Right toenails are normal.      Left foot:      Protective Sensation: 4 sites tested. 4 sites sensed.      Skin integrity: Callus present. No ulcer or skin breakdown.      Toenail Condition: Left toenails are normal.   Skin:     General: Skin is warm and dry.      Capillary Refill: Capillary refill takes less than 2 seconds.      Findings: No erythema or rash.   Neurological:      General: No focal deficit present.      Mental Status: She is alert.   Psychiatric:         Mood and Affect: Mood normal.         Behavior: Behavior normal.           Assessment & Plan:     51 y.o. female with the following -     Problem List Items Addressed This Visit     Type 2 diabetes mellitus without complications (HCC)     Given persistent A1c greater than 9% on multiple alternative medications, recommended initiation of insulin treatment to patient.  Patient agreeable to starting insulin and has experience in administering insulin based on helping family members.  Start weight-based basal insulin-20 units insulin glargine nightly  Check blood sugars fasting and 1 or 2 hours postprandial after meals.  We will likely discontinue glimepiride as blood sugars come into better control.  Advised patient to discontinue  glimepiride if any signs or symptoms of low blood sugar after starting insulin therapy.  Ophtho referral ordered today  Monofilament exam today with sensation intact except for 1 site tested.  CMP, lipid panel, and urine microalbumin to creatinine ratio ordered today  Return to clinic in 2 weeks to review blood sugars, review lab results, and adjust medications         Relevant Medications    Insulin Glargine, 1 Unit Dial, 300 UNIT/ML Solution Pen-injector    Other Relevant Orders    POCT  A1C (Completed)    Lipid Profile    Comp Metabolic Panel    MICROALBUMIN CREAT RATIO URINE    Referral to Ophthalmology    Dyslipidemia     Lipid panel today  Review lab results at follow-up in 2 weeks                     Return in about 2 weeks (around 3/17/2022).

## 2022-03-04 NOTE — ASSESSMENT & PLAN NOTE
Given persistent A1c greater than 9% on multiple alternative medications, recommended initiation of insulin treatment to patient.  Patient agreeable to starting insulin and has experience in administering insulin based on helping family members.  Start weight-based basal insulin-20 units insulin glargine nightly  Check blood sugars fasting and 1 or 2 hours postprandial after meals.  We will likely discontinue glimepiride as blood sugars come into better control.  Advised patient to discontinue glimepiride if any signs or symptoms of low blood sugar after starting insulin therapy.  Ophtho referral ordered today  Monofilament exam today with sensation intact except for 1 site tested.  CMP, lipid panel, and urine microalbumin to creatinine ratio ordered today  Return to clinic in 2 weeks to review blood sugars, review lab results, and adjust medications

## 2022-04-16 ENCOUNTER — HOSPITAL ENCOUNTER (OUTPATIENT)
Dept: LAB | Facility: MEDICAL CENTER | Age: 52
End: 2022-04-16
Attending: STUDENT IN AN ORGANIZED HEALTH CARE EDUCATION/TRAINING PROGRAM
Payer: COMMERCIAL

## 2022-04-16 DIAGNOSIS — E11.9 TYPE 2 DIABETES MELLITUS WITHOUT COMPLICATION, WITHOUT LONG-TERM CURRENT USE OF INSULIN (HCC): ICD-10-CM

## 2022-04-16 LAB
ALBUMIN SERPL BCP-MCNC: 4.8 G/DL (ref 3.2–4.9)
ALBUMIN/GLOB SERPL: 1.7 G/DL
ALP SERPL-CCNC: 46 U/L (ref 30–99)
ALT SERPL-CCNC: 29 U/L (ref 2–50)
ANION GAP SERPL CALC-SCNC: 15 MMOL/L (ref 7–16)
AST SERPL-CCNC: 22 U/L (ref 12–45)
BILIRUB SERPL-MCNC: 0.4 MG/DL (ref 0.1–1.5)
BUN SERPL-MCNC: 16 MG/DL (ref 8–22)
CALCIUM SERPL-MCNC: 9.7 MG/DL (ref 8.5–10.5)
CHLORIDE SERPL-SCNC: 101 MMOL/L (ref 96–112)
CHOLEST SERPL-MCNC: 206 MG/DL (ref 100–199)
CO2 SERPL-SCNC: 23 MMOL/L (ref 20–33)
CREAT SERPL-MCNC: 0.66 MG/DL (ref 0.5–1.4)
CREAT UR-MCNC: 44.75 MG/DL
FASTING STATUS PATIENT QL REPORTED: NORMAL
GFR SERPLBLD CREATININE-BSD FMLA CKD-EPI: 104 ML/MIN/1.73 M 2
GLOBULIN SER CALC-MCNC: 2.8 G/DL (ref 1.9–3.5)
GLUCOSE SERPL-MCNC: 266 MG/DL (ref 65–99)
HDLC SERPL-MCNC: 42 MG/DL
LDLC SERPL CALC-MCNC: 140 MG/DL
MICROALBUMIN UR-MCNC: 1.8 MG/DL
MICROALBUMIN/CREAT UR: 40 MG/G (ref 0–30)
POTASSIUM SERPL-SCNC: 4.1 MMOL/L (ref 3.6–5.5)
PROT SERPL-MCNC: 7.6 G/DL (ref 6–8.2)
SODIUM SERPL-SCNC: 139 MMOL/L (ref 135–145)
TRIGL SERPL-MCNC: 122 MG/DL (ref 0–149)

## 2022-04-16 PROCEDURE — 82043 UR ALBUMIN QUANTITATIVE: CPT

## 2022-04-16 PROCEDURE — 80061 LIPID PANEL: CPT

## 2022-04-16 PROCEDURE — 36415 COLL VENOUS BLD VENIPUNCTURE: CPT

## 2022-04-16 PROCEDURE — 82570 ASSAY OF URINE CREATININE: CPT

## 2022-04-16 PROCEDURE — 80053 COMPREHEN METABOLIC PANEL: CPT

## 2022-04-22 ENCOUNTER — APPOINTMENT (OUTPATIENT)
Dept: MEDICAL GROUP | Facility: CLINIC | Age: 52
End: 2022-04-22
Payer: COMMERCIAL

## 2022-05-17 ENCOUNTER — RESEARCH ENCOUNTER (OUTPATIENT)
Dept: RESEARCH | Facility: WORKSITE | Age: 52
End: 2022-05-17
Payer: COMMERCIAL

## 2022-05-17 ENCOUNTER — OFFICE VISIT (OUTPATIENT)
Dept: MEDICAL GROUP | Facility: CLINIC | Age: 52
End: 2022-05-17
Payer: COMMERCIAL

## 2022-05-17 VITALS
OXYGEN SATURATION: 94 % | DIASTOLIC BLOOD PRESSURE: 84 MMHG | BODY MASS INDEX: 33.49 KG/M2 | HEIGHT: 67 IN | TEMPERATURE: 97.6 F | HEART RATE: 90 BPM | SYSTOLIC BLOOD PRESSURE: 132 MMHG | WEIGHT: 213.4 LBS

## 2022-05-17 DIAGNOSIS — E66.9 CLASS 1 OBESITY WITH SERIOUS COMORBIDITY AND BODY MASS INDEX (BMI) OF 33.0 TO 33.9 IN ADULT, UNSPECIFIED OBESITY TYPE: ICD-10-CM

## 2022-05-17 DIAGNOSIS — E11.9 TYPE 2 DIABETES MELLITUS WITHOUT COMPLICATION, WITHOUT LONG-TERM CURRENT USE OF INSULIN (HCC): ICD-10-CM

## 2022-05-17 DIAGNOSIS — Z13.79 GENETIC SCREENING: ICD-10-CM

## 2022-05-17 DIAGNOSIS — I10 PRIMARY HYPERTENSION: ICD-10-CM

## 2022-05-17 DIAGNOSIS — Z00.6 RESEARCH STUDY PATIENT: ICD-10-CM

## 2022-05-17 PROCEDURE — 99214 OFFICE O/P EST MOD 30 MIN: CPT | Performed by: STUDENT IN AN ORGANIZED HEALTH CARE EDUCATION/TRAINING PROGRAM

## 2022-05-17 ASSESSMENT — ENCOUNTER SYMPTOMS
SHORTNESS OF BREATH: 0
DOUBLE VISION: 0
HEADACHES: 0
WEAKNESS: 0
DIZZINESS: 0
DEPRESSION: 0
WEIGHT LOSS: 0
BLURRED VISION: 0
ABDOMINAL PAIN: 0
COUGH: 0

## 2022-05-17 ASSESSMENT — PATIENT HEALTH QUESTIONNAIRE - PHQ9: CLINICAL INTERPRETATION OF PHQ2 SCORE: 0

## 2022-05-17 NOTE — ASSESSMENT & PLAN NOTE
Monofilament testing with a 10 gram force: sensation intact: intact bilaterally  Visual Inspection: Feet without maceration, ulcers, fissures.  Pedal pulses: intact bilaterally    LDL above goal; patient does not tolerate statins. Reviewed heart healthy diet, regular physical activity. Continue red yeast rice.   BP at goal  Microalbuminuria (labs April 2022), on ARB  Continue current meds  Okay to increase insulin glargine by 2 units per night each week up to max of 30 units per night.  Continue to check BGs, fasting and postprandial helpful  As BGs become better controlled, plan to d/c sulfonylurea  Reviewed hypoglycemia symptoms  Referred to Pharm D and diabetes education  Next A1C due on or after 6/3/2022, lab order provided

## 2022-05-17 NOTE — PROGRESS NOTES
Subjective:     CC: DM f/u    HPI:   Willa presents today with    Problem   Genetic Screening    Offered BRCA and other genetic screening via [facility] [employer]. Patient reports she has already completed this.  She is a candidate for participation in DINH study and accepts     Type 2 Diabetes Mellitus Without Complications (Hcc)    Diagnosed >20 years ago  Strong family history; patient's mother  from complications related to diabetes.  Current Meds:   Metformin ER 1,000 mg BID  Glimepiride 2 mg BID  Empagliflozin 25 mg daily  Exenatide ER 2 mg weekly  Insulin glargine 20 units nighttime (started 2022)  Lifestyle: Regular exercise program since 2021, since last visit has increased from ~30 minutes activity per day to ~60 minutes physical activity per day. Enjoys hiking and spending time outdoors.  Reports ketogenic diet  A1C 10.3% on 3/3/2022 (11.5% in 2021)  Ophtho exam: recent exam (since last visit) per patient with no concerning changes related to diabetes  Monofilament exam: today  Lipid panel: ; patient does not tolerate statin medications  BP: at goal    Home BGs random and with mild improvement from average 250s-350s to 150s-250s         Obesity    Patient working to lose weight  - Increasing physical activity, current goal about 60 minutes of aerobic activity daily  - Finding it easier to hike as fitness has gradually increased     Hypertension    BP well controlled, 132/84 today  Denies new H/A, vision changes, CP, dyspnea, or peripheral edema  Taking losartan 100 mg daily and amlodipine 5 mg daily         ROS:  Review of Systems   Constitutional: Negative for weight loss.   Eyes: Negative for blurred vision and double vision.   Respiratory: Negative for cough and shortness of breath.    Cardiovascular: Negative for chest pain and leg swelling.   Gastrointestinal: Negative for abdominal pain.   Neurological: Negative for dizziness, weakness and headaches.  "  Psychiatric/Behavioral: Negative for depression.       Objective:     Exam:  /84 (BP Location: Left arm, Patient Position: Sitting, BP Cuff Size: Large adult)   Pulse 90   Temp 36.4 °C (97.6 °F)   Ht 1.708 m (5' 7.25\")   Wt 96.8 kg (213 lb 6.4 oz)   LMP 01/09/2002   SpO2 94%   BMI 33.18 kg/m²  Body mass index is 33.18 kg/m².    Physical Exam  Constitutional:       General: She is not in acute distress.     Appearance: Normal appearance.   HENT:      Head: Normocephalic and atraumatic.      Right Ear: External ear normal.      Left Ear: External ear normal.   Eyes:      Conjunctiva/sclera: Conjunctivae normal.   Cardiovascular:      Rate and Rhythm: Normal rate and regular rhythm.      Pulses: Normal pulses.      Heart sounds: Normal heart sounds. No murmur heard.  Pulmonary:      Effort: Pulmonary effort is normal. No respiratory distress.      Breath sounds: Normal breath sounds. No wheezing or rhonchi.   Abdominal:      General: There is no distension.      Palpations: Abdomen is soft.   Musculoskeletal:         General: No swelling or deformity.   Skin:     General: Skin is warm and dry.      Capillary Refill: Capillary refill takes less than 2 seconds.      Findings: No rash.   Neurological:      General: No focal deficit present.      Mental Status: She is alert.      Sensory: No sensory deficit.      Gait: Gait normal.      Comments: Monofilament testing with a 10 gram force: sensation intact: intact bilaterally  Visual Inspection: Feet without maceration, ulcers, fissures.  Pedal pulses: intact bilaterally     Psychiatric:         Mood and Affect: Mood normal.         Behavior: Behavior normal.         Thought Content: Thought content normal.         Judgment: Judgment normal.           Assessment & Plan:     51 y.o. female with the following -     Problem List Items Addressed This Visit     Type 2 diabetes mellitus without complications (HCC)     Monofilament testing with a 10 gram force: " sensation intact: intact bilaterally  Visual Inspection: Feet without maceration, ulcers, fissures.  Pedal pulses: intact bilaterally    LDL above goal; patient does not tolerate statins. Reviewed heart healthy diet, regular physical activity. Continue red yeast rice.   BP at goal  Microalbuminuria (labs April 2022), on ARB  Continue current meds  Okay to increase insulin glargine by 2 units per night each week up to max of 30 units per night.  Continue to check BGs, fasting and postprandial helpful  As BGs become better controlled, plan to d/c sulfonylurea  Reviewed hypoglycemia symptoms  Referred to Pharm D and diabetes education  Next A1C due on or after 6/3/2022, lab order provided           Relevant Orders    HEMOGLOBIN A1C    Referral to Diabetic Education    Referral to Pharmacotherapy Service    Diabetic Monofilament LE Exam (OR use .monofilament SmartPhrase in Progress Note to close Care Gap) (Completed)    Obesity     Encouraged patient to continue participating in regular physical activity             Hypertension     On ARB and CCB  BP at goal  Denies symptoms of end-organ damage           Genetic screening     Enrolled in DINH study during today's viist  CBC ordered as not completed in past year           Relevant Orders    CBC WITHOUT DIFFERENTIAL                Return in about 3 months (around 8/17/2022).

## 2022-05-17 NOTE — PATIENT INSTRUCTIONS
Insulin glargine:  Increase dose by 2 units per night. Check blood sugars and if average remaining >200 after 1 week, okay to continue to increase dose by 2 units per night up to max dose of 30 units nightly.         Type 2 Diabetes Mellitus, Diagnosis, Adult  Type 2 diabetes (type 2 diabetes mellitus) is a long-term (chronic) disease. It may be caused by one or both of these problems:  Your pancreas does not make enough of a hormone called insulin.  Your body does not react in a normal way to insulin that it makes.  Insulin lets sugars (glucose) go into cells in your body. This gives you energy. If you have type 2 diabetes, sugars cannot get into cells. This causes high blood sugar (hyperglycemia).  Your doctor will set treatment goals for you. Generally, you should have these blood sugar levels:  Before meals (preprandial):  mg/dL (4.4-7.2 mmol/L).  After meals (postprandial): below 180 mg/dL (10 mmol/L).  A1c (hemoglobin A1c) level: less than 7%.  Follow these instructions at home:  Questions to ask your doctor  You may want to ask these questions:  Do I need to meet with a diabetes educator?  Where can I find a support group for people with diabetes?  What equipment will I need to care for myself at home?  What diabetes medicines do I need? When should I take them?  How often do I need to check my blood sugar?  What number can I call if I have questions?  When is my next doctor's visit?  General instructions  Take over-the-counter and prescription medicines only as told by your doctor.  Keep all follow-up visits as told by your doctor. This is important.  Contact a doctor if:  Your blood sugar is at or above 240 mg/dL (13.3 mmol/L) for 2 days in a row.  You have been sick for 2 days or more, and you are not getting better.  You have had a fever for 2 days or more, and you are not getting better.  You have any of these problems for more than 6 hours:  You cannot eat or drink.  You feel sick to your stomach  (nauseous).  You throw up (vomit).  You have watery poop (diarrhea).  Get help right away if:  Your blood sugar is lower than 54 mg/dL (3 mmol/L).  You get confused.  You have trouble:  Thinking clearly.  Breathing.  You have moderate or large ketone levels in your pee (urine).  Summary  Type 2 diabetes is a long-term (chronic) disease. Your pancreas may not make enough of a hormone called insulin, or your body may not react normally to insulin that it makes.  Take over-the-counter and prescription medicines only as told by your doctor.  Keep all follow-up visits as told by your doctor. This is important.  This information is not intended to replace advice given to you by your health care provider. Make sure you discuss any questions you have with your health care provider.  Document Released: 09/26/2009 Document Revised: 02/15/2019 Document Reviewed: 01/20/2017  [employer] Patient Education © 2020 [employer]      Diabetes Mellitus and Foot Care  Foot care is an important part of your health, especially when you have diabetes. Diabetes may cause you to have problems because of poor blood flow (circulation) to your feet and legs, which can cause your skin to:  Become thinner and drier.  Break more easily.  Heal more slowly.  Peel and crack.  You may also have nerve damage (neuropathy) in your legs and feet, causing decreased feeling in them. This means that you may not notice minor injuries to your feet that could lead to more serious problems. Noticing and addressing any potential problems early is the best way to prevent future foot problems.  How to care for your feet  Foot hygiene  Wash your feet daily with warm water and mild soap. Do not use hot water. Then, pat your feet and the areas between your toes until they are completely dry. Do not soak your feet as this can dry your skin.  Trim your toenails straight across. Do not dig under them or around the cuticle. File the edges of your nails with an emery board  or nail file.  Apply a moisturizing lotion or petroleum jelly to the skin on your feet and to dry, brittle toenails. Use lotion that does not contain alcohol and is unscented. Do not apply lotion between your toes.  Shoes and socks  Wear clean socks or stockings every day. Make sure they are not too tight. Do not wear knee-high stockings since they may decrease blood flow to your legs.  Wear shoes that fit properly and have enough cushioning. Always look in your shoes before you put them on to be sure there are no objects inside.  To break in new shoes, wear them for just a few hours a day. This prevents injuries on your feet.  Wounds, scrapes, corns, and calluses  Check your feet daily for blisters, cuts, bruises, sores, and redness. If you cannot see the bottom of your feet, use a mirror or ask someone for help.  Do not cut corns or calluses or try to remove them with medicine.  If you find a minor scrape, cut, or break in the skin on your feet, keep it and the skin around it clean and dry. You may clean these areas with mild soap and water. Do not clean the area with peroxide, alcohol, or iodine.  If you have a wound, scrape, corn, or callus on your foot, look at it several times a day to make sure it is healing and not infected. Check for:  Redness, swelling, or pain.  Fluid or blood.  Warmth.  Pus or a bad smell.  General instructions  Do not cross your legs. This may decrease blood flow to your feet.  Do not use heating pads or hot water bottles on your feet. They may burn your skin. If you have lost feeling in your feet or legs, you may not know this is happening until it is too late.  Protect your feet from hot and cold by wearing shoes, such as at the beach or on hot pavement.  Schedule a complete foot exam at least once a year (annually) or more often if you have foot problems. If you have foot problems, report any cuts, sores, or bruises to your health care provider immediately.  Contact a health care  provider if:  You have a medical condition that increases your risk of infection and you have any cuts, sores, or bruises on your feet.  You have an injury that is not healing.  You have redness on your legs or feet.  You feel burning or tingling in your legs or feet.  You have pain or cramps in your legs and feet.  Your legs or feet are numb.  Your feet always feel cold.  You have pain around a toenail.  Get help right away if:  You have a wound, scrape, corn, or callus on your foot and:  You have pain, swelling, or redness that gets worse.  You have fluid or blood coming from the wound, scrape, corn, or callus.  Your wound, scrape, corn, or callus feels warm to the touch.  You have pus or a bad smell coming from the wound, scrape, corn, or callus.  You have a fever.  You have a red line going up your leg.  Summary  Check your feet every day for cuts, sores, red spots, swelling, and blisters.  Moisturize feet and legs daily.  Wear shoes that fit properly and have enough cushioning.  If you have foot problems, report any cuts, sores, or bruises to your health care provider immediately.  Schedule a complete foot exam at least once a year (annually) or more often if you have foot problems.  This information is not intended to replace advice given to you by your health care provider. Make sure you discuss any questions you have with your health care provider.  Document Released: 12/15/2001 Document Revised: 01/30/2019 Document Reviewed: 01/19/2018  [employer] Patient Education © 2020 [employer]      Diabetes Mellitus and Nutrition, Adult  When you have diabetes (diabetes mellitus), it is very important to have healthy eating habits because your blood sugar (glucose) levels are greatly affected by what you eat and drink. Eating healthy foods in the appropriate amounts, at about the same times every day, can help you:  Control your blood glucose.  Lower your risk of heart disease.  Improve your blood pressure.  Reach or  maintain a healthy weight.  Every person with diabetes is different, and each person has different needs for a meal plan. Your health care provider may recommend that you work with a diet and nutrition specialist (dietitian) to make a meal plan that is best for you. Your meal plan may vary depending on factors such as:  The calories you need.  The medicines you take.  Your weight.  Your blood glucose, blood pressure, and cholesterol levels.  Your activity level.  Other health conditions you have, such as heart or kidney disease.  How do carbohydrates affect me?  Carbohydrates, also called carbs, affect your blood glucose level more than any other type of food. Eating carbs naturally raises the amount of glucose in your blood. Carb counting is a method for keeping track of how many carbs you eat. Counting carbs is important to keep your blood glucose at a healthy level, especially if you use insulin or take certain oral diabetes medicines.  It is important to know how many carbs you can safely have in each meal. This is different for every person. Your dietitian can help you calculate how many carbs you should have at each meal and for each snack.  Foods that contain carbs include:  Bread, cereal, rice, pasta, and crackers.  Potatoes and corn.  Peas, beans, and lentils.  Milk and yogurt.  Fruit and juice.  Desserts, such as cakes, cookies, ice cream, and candy.  How does alcohol affect me?  Alcohol can cause a sudden decrease in blood glucose (hypoglycemia), especially if you use insulin or take certain oral diabetes medicines. Hypoglycemia can be a life-threatening condition. Symptoms of hypoglycemia (sleepiness, dizziness, and confusion) are similar to symptoms of having too much alcohol.  If your health care provider says that alcohol is safe for you, follow these guidelines:  Limit alcohol intake to no more than 1 drink per day for nonpregnant women and 2 drinks per day for men. One drink equals 12 oz of beer, 5  "oz of wine, or 1½ oz of hard liquor.  Do not drink on an empty stomach.  Keep yourself hydrated with water, diet soda, or unsweetened iced tea.  Keep in mind that regular soda, juice, and other mixers may contain a lot of sugar and must be counted as carbs.  What are tips for following this plan?    Reading food labels  Start by checking the serving size on the \"Nutrition Facts\" label of packaged foods and drinks. The amount of calories, carbs, fats, and other nutrients listed on the label is based on one serving of the item. Many items contain more than one serving per package.  Check the total grams (g) of carbs in one serving. You can calculate the number of servings of carbs in one serving by dividing the total carbs by 15. For example, if a food has 30 g of total carbs, it would be equal to 2 servings of carbs.  Check the number of grams (g) of saturated and trans fats in one serving. Choose foods that have low or no amount of these fats.  Check the number of milligrams (mg) of salt (sodium) in one serving. Most people should limit total sodium intake to less than 2,300 mg per day.  Always check the nutrition information of foods labeled as \"low-fat\" or \"nonfat\". These foods may be higher in added sugar or refined carbs and should be avoided.  Talk to your dietitian to identify your daily goals for nutrients listed on the label.  Shopping  Avoid buying canned, premade, or processed foods. These foods tend to be high in fat, sodium, and added sugar.  Shop around the outside edge of the grocery store. This includes fresh fruits and vegetables, bulk grains, fresh meats, and fresh dairy.  Cooking  Use low-heat cooking methods, such as baking, instead of high-heat cooking methods like deep frying.  Cook using healthy oils, such as olive, canola, or sunflower oil.  Avoid cooking with butter, cream, or high-fat meats.  Meal planning  Eat meals and snacks regularly, preferably at the same times every day. Avoid going " long periods of time without eating.  Eat foods high in fiber, such as fresh fruits, vegetables, beans, and whole grains. Talk to your dietitian about how many servings of carbs you can eat at each meal.  Eat 4-6 ounces (oz) of lean protein each day, such as lean meat, chicken, fish, eggs, or tofu. One oz of lean protein is equal to:  1 oz of meat, chicken, or fish.  1 egg.  ¼ cup of tofu.  Eat some foods each day that contain healthy fats, such as avocado, nuts, seeds, and fish.  Lifestyle  Check your blood glucose regularly.  Exercise regularly as told by your health care provider. This may include:  150 minutes of moderate-intensity or vigorous-intensity exercise each week. This could be brisk walking, biking, or water aerobics.  Stretching and doing strength exercises, such as yoga or weightlifting, at least 2 times a week.  Take medicines as told by your health care provider.  Do not use any products that contain nicotine or tobacco, such as cigarettes and e-cigarettes. If you need help quitting, ask your health care provider.  Work with a counselor or diabetes educator to identify strategies to manage stress and any emotional and social challenges.  Questions to ask a health care provider  Do I need to meet with a diabetes educator?  Do I need to meet with a dietitian?  What number can I call if I have questions?  When are the best times to check my blood glucose?  Where to find more information:  American Diabetes Association: diabetes.org  Academy of Nutrition and Dietetics: www.eatright.org  National [place] of Diabetes and Digestive and Kidney Diseases (NIH): www.niddk.nih.gov  Summary  A healthy meal plan will help you control your blood glucose and maintain a healthy lifestyle.  Working with a diet and nutrition specialist (dietitian) can help you make a meal plan that is best for you.  Keep in mind that carbohydrates (carbs) and alcohol have immediate effects on your blood glucose levels. It is  important to count carbs and to use alcohol carefully.  This information is not intended to replace advice given to you by your health care provider. Make sure you discuss any questions you have with your health care provider.  Document Released: 09/14/2006 Document Revised: 11/30/2018 Document Reviewed: 01/22/2018  [employer] Patient Education © 2020 [employer].

## 2022-05-18 ENCOUNTER — DOCUMENTATION (OUTPATIENT)
Dept: VASCULAR LAB | Facility: MEDICAL CENTER | Age: 52
End: 2022-05-18
Payer: COMMERCIAL

## 2022-05-18 NOTE — PROGRESS NOTES
[facility]    Received pharmacotherapy referral for DMII from Dr. Maegan Ochoa on 5-17-22.    LM with patient to call back to establish care.    Insurance: [facility]  PCP: [facility]  Locations to be seen: Any    [facility] at [phone], fax [phone]    Will Conn, GaelD, BCACP

## 2022-05-23 ENCOUNTER — HOSPITAL ENCOUNTER (OUTPATIENT)
Facility: MEDICAL CENTER | Age: 52
End: 2022-05-23
Attending: PATHOLOGY
Payer: COMMERCIAL

## 2022-05-23 DIAGNOSIS — Z00.6 RESEARCH STUDY PATIENT: ICD-10-CM

## 2022-05-24 LAB
ELF SCORE: 9.2
RELATIVE RISK: NORMAL
RISK GROUP: NORMAL
RISK: 3.3 %

## 2022-05-27 ENCOUNTER — NON-PROVIDER VISIT (OUTPATIENT)
Dept: VASCULAR LAB | Facility: MEDICAL CENTER | Age: 52
End: 2022-05-27
Attending: INTERNAL MEDICINE
Payer: COMMERCIAL

## 2022-05-27 DIAGNOSIS — E11.9 TYPE 2 DIABETES MELLITUS WITHOUT COMPLICATION, WITHOUT LONG-TERM CURRENT USE OF INSULIN (HCC): ICD-10-CM

## 2022-05-27 LAB
HBA1C MFR BLD: 9.3 % (ref 0–5.6)
INT CON NEG: ABNORMAL
INT CON POS: ABNORMAL

## 2022-05-27 PROCEDURE — 83036 HEMOGLOBIN GLYCOSYLATED A1C: CPT

## 2022-05-27 PROCEDURE — 99213 OFFICE O/P EST LOW 20 MIN: CPT

## 2022-05-27 RX ORDER — DULAGLUTIDE 1.5 MG/.5ML
0.5 INJECTION, SOLUTION SUBCUTANEOUS
Qty: 6 ML | Refills: 1 | Status: SHIPPED
Start: 2022-05-27 | End: 2022-07-29

## 2022-05-27 NOTE — PROGRESS NOTES
Patient Consult Note    TIME IN: 1.00 pm   TIME OUT: 1.40 pm    Primary care physician: Maegan Ochoa M.D.    Reason for consult: Management of Uncontrolled Type 2 Diabetes    HPI:  Willa Haywood is a 52 y.o. old patient who comes in today for evaluation of above stated problem.    Most Recent HbA1c and POCT glucose:   Lab Results   Component Value Date/Time    HBA1C 9.3 (A) 05/27/2022 12:00 AM            Most Recent Scr:  Lab Results   Component Value Date/Time    CREATININE 0.66 04/16/2022 08:15 AM    CREATININE 0.57 06/26/2009 10:17 AM        Diabetes Medication History and Current Regimen  Metformin: IR/ER Take 2 tablets twice daily   GLP-1 Agent: Bydureon 2 mg once weekly   SGLT-2 Inhibitor: Jardiance 25 mg   Sulfonylurea: glmeparide 2 mg one tablet twice daily     Basal Insulin: Lantus 22 units     Pt has home glucometer and proper testing technique - Yes     Pt reports blood sugars:   Other times: 160-300s    Hypoglycemia awareness - Yes  Nocturnal hypoglycemia- None   Hypoglycemia:  None    Pt's treatment of Hypoglycemia -   - 15:15 Rule    Current Exercise - 5-7 days a week. 30 mins-60 mins  daily  Exercise Goal - None     Dietary - Low carb high protein.    Foot Exam:  Monofilament exam - At last PCP   Monofilament testing with a 10 gram force: sensation intact: decreased bilaterally.    Visual Inspection: Feet without maceration, ulcers, fissures.  Feet dry.  Pedal pulses: intact bilaterally    Preventative Management  BP regimen (ACE/ARB) - Losartan 100mg  Aspirin 81 mg    Last A1c -   Lab Results   Component Value Date/Time    HBA1C 9.3 (A) 05/27/2022 12:00 AM      Last Microalbuminuria -    Latest Reference Range & Units 04/16/22 08:15   Microalbumin, Urine Random mg/dL 1.8       Past Medical History:  Patient Active Problem List    Diagnosis Date Noted   • Genetic screening 05/17/2022   • Chest wall contusion, left, subsequent encounter 01/07/2022   • Contusion, buttock, subsequent  encounter 01/07/2022   • Fall due to slipping on ice or snow 12/29/2021   • Rib pain on left side 12/29/2021   • Pain in left buttock 12/29/2021   • Herpes simplex infection of skin 07/13/2021   • Laceration without foreign body, right foot, initial encounter 08/01/2019   • Encounter for general adult medical examination without abnormal findings 08/02/2018   • Sciatica 08/02/2018   • Albuminuria manifested in a patient with type 2 DM 12/15/2013   • Vitamin D deficiency 05/01/2012   • Dyslipidemia 02/09/2012   • S/P partial hysterectomy 06/26/2009   • Type 2 diabetes mellitus without complications (HCC) 06/26/2009   • Obesity 06/26/2009   • Hypertension 06/26/2009   • Polycystic ovarian syndrome 06/26/2009       Past Surgical History:  Past Surgical History:   Procedure Laterality Date   • KNEE ARTHROSCOPY  october 2013    right medial meniscus repair       Allergies:  Hmg-coa-r inhibitors, Other drug, Enalapril, Lovastatin, and Lisinopril    Social History:  Social History     Socioeconomic History   • Marital status:      Spouse name: Not on file   • Number of children: Not on file   • Years of education: Not on file   • Highest education level: Not on file   Occupational History   • Not on file   Tobacco Use   • Smoking status: Former Smoker   • Smokeless tobacco: Never Used   • Tobacco comment: TEEN   Vaping Use   • Vaping Use: Never used   Substance and Sexual Activity   • Alcohol use: Yes     Comment: RARELY   • Drug use: No   • Sexual activity: Not on file     Comment:    Other Topics Concern   • Not on file   Social History Narrative   • Not on file     Social Determinants of Health     Financial Resource Strain: Not on file   Food Insecurity: Not on file   Transportation Needs: Not on file   Physical Activity: Not on file   Stress: Not on file   Social Connections: Not on file   Intimate Partner Violence: Not on file   Housing Stability: Not on file       Family History:  No family history on  file.    Medications:    Current Outpatient Medications:   •  Dulaglutide (TRULICITY) 1.5 MG/0.5ML Solution Pen-injector, Inject 0.5 mL under the skin every 7 days., Disp: 6 mL, Rfl: 1  •  Insulin Pen Needle 31G X 5 MM Misc, 1 Units at bedtime. Use with Insulin pen as prescribed, Disp: 100 Each, Rfl: 3  •  Insulin Glargine, 1 Unit Dial, 300 UNIT/ML Solution Pen-injector, Inject 20 Units under the skin at bedtime., Disp: 5 Each, Rfl: 1  •  gemfibrozil (LOPID) 600 MG Tab, GEMFIBROZIL 600 MG TABS, Disp: , Rfl:   •  Empagliflozin (JARDIANCE) 25 MG Tab, JARDIANCE 25 MG TABS, Disp: , Rfl:   •  losartan (COZAAR) 100 MG Tab, TAKE ONE TABLET BY MOUTH ONE TIME DAILY, Disp: 90 Tab, Rfl: 2  •  amlodipine (NORVASC) 5 MG Tab, TAKE ONE TABLET BY MOUTH ONE TIME DAILY, Disp: 90 Tab, Rfl: 2  •  glimepiride (AMARYL) 2 MG Tab, Take 1 Tab by mouth 2 times a day., Disp: 180 Tab, Rfl: 3  •  metformin ER (GLUCOPHAGE XR) 500 MG TABLET SR 24 HR, Take 2 Tabs by mouth 2 times a day., Disp: 360 Tab, Rfl: 3  •  hydrochlorothiazide (HYDRODIURIL) 25 MG Tab, Take 1 Tab by mouth every day., Disp: 90 Tab, Rfl: 3  •  aspirin EC (ECOTRIN) 325 MG TBEC, Take 325 mg by mouth every 48 hours., Disp: , Rfl:   •  cholecalciferol (VITAMIN D) 400 UNIT CAPS, Take 800 Units by mouth every day., Disp: , Rfl:   •  ASCENSIA MICROFILL TEST strip, TEST 3 X DAILY AS DIRECTED, Disp: 100 Each, Rfl: 6  •  Red Yeast Rice 600 MG TABS, Take  by mouth 2 Times a Day., Disp: , Rfl:   •  LORATADINE, 10 mg every day. PRN / OTC, Disp: , Rfl:   •  FISH OIL 1000 MG PO CAPS, Take 4,000 mg by mouth every morning. FISH OIL /OTC, Disp: , Rfl:     Labs: Reviewed    Physical Examination:  Vital signs: LMP 01/09/2002  There is no height or weight on file to calculate BMI.    Assessment and Plan:    1. DM2  • Patient was seen today for the first time with [facility] pharmacotherapy clinic. Patient has had DM II for over 20 years and also has a FH of DM II.   • Patient overall is well  knowledgeable about appropriate lifestyle changes with DM II, she exercises or hikes daily for 30-60 mins and tries to have low carb meals. She is currently titrating her basal insulin by 2 units weekly, we discussed BG goals, she is to continue increasing until she gets to a fasting BG of , with a max of 30 U.  • A1C down from 10.3 to 9.3 today    - Medication changes:  • Patient has been on Bydureon for several years now, Bydureon has a single dose of 2 mg, she will benefit from a GLP 1 that has options for dosage titration  • Stop Bydureon, start Trulicity 1.5 mg once weekly (starting at 1.5 mg as she has tolerated Bydureon for years). Samples of Trulicity provided as it requires a PA.   • In the future, as BG improves, consider d/c of glimepiride to decrease risk of hypoglycemia.    - Lifestyle changes:  • Continue daily exercise.    Follow Up:  4 weeks    Rosa Aldrich, GaelD    CC:   Maegan Ochoa M.D.  Jasiel Rodriguez

## 2022-05-31 ENCOUNTER — TELEPHONE (OUTPATIENT)
Dept: VASCULAR LAB | Facility: MEDICAL CENTER | Age: 52
End: 2022-05-31
Payer: COMMERCIAL

## 2022-05-31 NOTE — TELEPHONE ENCOUNTER
Contacted patient at [phone] to discuss [facility] and services/benefits offered. No answer, left voicemail.    Carol Bustamante  Rx Coordinator   [phone]

## 2022-06-24 ENCOUNTER — NON-PROVIDER VISIT (OUTPATIENT)
Dept: VASCULAR LAB | Facility: MEDICAL CENTER | Age: 52
End: 2022-06-24
Attending: INTERNAL MEDICINE
Payer: COMMERCIAL

## 2022-06-24 PROCEDURE — 99212 OFFICE O/P EST SF 10 MIN: CPT

## 2022-06-24 NOTE — Clinical Note
Pt changed from Bydureon to Trulicity at last appt, experiencing nausea but BG looking much better. Reports intermittent shakiness/nausea during AM exercise - encouraged pt to check BG when this happens given concern for hypoglycemia. Stopped glimepiride at today's appointment.

## 2022-06-24 NOTE — PROGRESS NOTES
"Patient Consult Note  Primary care physician: Maegan Ochoa M.D.    Reason for consult: Management of Uncontrolled Type 2 Diabetes    HPI:  Willa Haywood is a 52 y.o. old patient who comes in today for evaluation of above stated problem.    Most Recent HbA1c and POCT glucose:   Lab Results   Component Value Date/Time    HBA1C 9.3 (A) 05/27/2022 12:00 AM            Most Recent Scr:  Lab Results   Component Value Date/Time    CREATININE 0.66 04/16/2022 08:15 AM    CREATININE 0.57 06/26/2009 10:17 AM        Diabetes Medication History and Current Regimen  Metformin: IR/ER Take 2 tablets twice daily   GLP-1 Agent: Trulicity 1.5 mg weekly  SGLT-2 Inhibitor: Jardiance 25 mg   Sulfonylurea: glimepiride 2 mg twice daily      Basal Insulin: Lantus 24 units     Pt has home glucometer and proper testing technique - yes    Pt reports blood sugars:   Before Breakfast: 116-168, has had multiple sugars below 120 since starting Trulicity    Hypoglycemia awareness - Yes  Nocturnal hypoglycemia- None   Hypoglycemia:  None     Pt's treatment of Hypoglycemia -   - 15:15 Rule     Current Exercise - 5-7 days a week. Reports she exercises for 30 minutes each morning and bikes to work most days except Fridays.     Dietary - Patient has been working on eating smaller portions due to nausea with Trulicity. Reports she has been attending a bariatric surgery support group that focuses on quality of food over quantity - ie the \"perfect bite.\" Working on less carbs, reports eating lots of chickpeas and enjoys making her own hummus.    Foot Exam:  Monofilament exam - at last PCP visit, up to date    Preventative Management  BP regimen (ACE/ARB) - Losartan 100 mg  ASA - 81 mg daily  Statin - NA  Last Foot Exam - at PCP  Last A1c -   Lab Results   Component Value Date/Time    HBA1C 9.3 (A) 05/27/2022 12:00 AM      Last Microalbuminuria - 4/2022    updated caregaps    Past Medical History:  Patient Active Problem List    Diagnosis Date " Noted   • Genetic screening 05/17/2022   • Chest wall contusion, left, subsequent encounter 01/07/2022   • Contusion, buttock, subsequent encounter 01/07/2022   • Fall due to slipping on ice or snow 12/29/2021   • Rib pain on left side 12/29/2021   • Pain in left buttock 12/29/2021   • Herpes simplex infection of skin 07/13/2021   • Laceration without foreign body, right foot, initial encounter 08/01/2019   • Encounter for general adult medical examination without abnormal findings 08/02/2018   • Sciatica 08/02/2018   • Albuminuria manifested in a patient with type 2 DM 12/15/2013   • Vitamin D deficiency 05/01/2012   • Dyslipidemia 02/09/2012   • S/P partial hysterectomy 06/26/2009   • Type 2 diabetes mellitus without complications (HCC) 06/26/2009   • Obesity 06/26/2009   • Hypertension 06/26/2009   • Polycystic ovarian syndrome 06/26/2009       Past Surgical History:  Past Surgical History:   Procedure Laterality Date   • KNEE ARTHROSCOPY  october 2013    right medial meniscus repair       Allergies:  Hmg-coa-r inhibitors, Other drug, Enalapril, Lovastatin, and Lisinopril    Social History:  Social History     Socioeconomic History   • Marital status:      Spouse name: Not on file   • Number of children: Not on file   • Years of education: Not on file   • Highest education level: Not on file   Occupational History   • Not on file   Tobacco Use   • Smoking status: Former Smoker   • Smokeless tobacco: Never Used   • Tobacco comment: TEEN   Vaping Use   • Vaping Use: Never used   Substance and Sexual Activity   • Alcohol use: Yes     Comment: RARELY   • Drug use: No   • Sexual activity: Not on file     Comment:    Other Topics Concern   • Not on file   Social History Narrative   • Not on file     Social Determinants of Health     Financial Resource Strain: Not on file   Food Insecurity: Not on file   Transportation Needs: Not on file   Physical Activity: Not on file   Stress: Not on file   Social  Connections: Not on file   Intimate Partner Violence: Not on file   Housing Stability: Not on file       Family History:  No family history on file.    Medications:    Current Outpatient Medications:   •  Dulaglutide (TRULICITY) 1.5 MG/0.5ML Solution Pen-injector, Inject 0.5 mL under the skin every 7 days., Disp: 6 mL, Rfl: 1  •  Insulin Pen Needle 31G X 5 MM Misc, 1 Units at bedtime. Use with Insulin pen as prescribed, Disp: 100 Each, Rfl: 3  •  Insulin Glargine, 1 Unit Dial, 300 UNIT/ML Solution Pen-injector, Inject 20 Units under the skin at bedtime., Disp: 5 Each, Rfl: 1  •  gemfibrozil (LOPID) 600 MG Tab, GEMFIBROZIL 600 MG TABS, Disp: , Rfl:   •  Empagliflozin (JARDIANCE) 25 MG Tab, JARDIANCE 25 MG TABS, Disp: , Rfl:   •  losartan (COZAAR) 100 MG Tab, TAKE ONE TABLET BY MOUTH ONE TIME DAILY, Disp: 90 Tab, Rfl: 2  •  amlodipine (NORVASC) 5 MG Tab, TAKE ONE TABLET BY MOUTH ONE TIME DAILY, Disp: 90 Tab, Rfl: 2  •  glimepiride (AMARYL) 2 MG Tab, Take 1 Tab by mouth 2 times a day., Disp: 180 Tab, Rfl: 3  •  metformin ER (GLUCOPHAGE XR) 500 MG TABLET SR 24 HR, Take 2 Tabs by mouth 2 times a day., Disp: 360 Tab, Rfl: 3  •  hydrochlorothiazide (HYDRODIURIL) 25 MG Tab, Take 1 Tab by mouth every day., Disp: 90 Tab, Rfl: 3  •  aspirin EC (ECOTRIN) 325 MG TBEC, Take 325 mg by mouth every 48 hours., Disp: , Rfl:   •  cholecalciferol (VITAMIN D) 400 UNIT CAPS, Take 800 Units by mouth every day., Disp: , Rfl:   •  ASCENSIA MICROFILL TEST strip, TEST 3 X DAILY AS DIRECTED, Disp: 100 Each, Rfl: 6  •  Red Yeast Rice 600 MG TABS, Take  by mouth 2 Times a Day., Disp: , Rfl:   •  LORATADINE, 10 mg every day. PRN / OTC, Disp: , Rfl:   •  FISH OIL 1000 MG PO CAPS, Take 4,000 mg by mouth every morning. FISH OIL /OTC, Disp: , Rfl:     Labs: Reviewed    Physical Examination:  Vital signs: LMP 01/09/2002  There is no height or weight on file to calculate BMI.    Assessment and Plan:    1. DM2  • Pt reports increased N/V with change to  Trulicity, but reports blood sugars have been much more well controlled with multiple AM BG readings below 120. Pt wants to continue on current dose given current BG readings.  • Reports intermittent shakiness/dizziness during exercise in AM. She does not check BG at these times, but does eat carbs. Encouraged pt to check sugars when this occurs to tell if hypo vs hyperglycemic so can manage accordingly.    - Medication changes:  • Will STOP glimepiride as multiple BG within goal and concern for potential AM hypoglycemia based on patient symptoms.  • Pt to continue titrating Lantus as previously instructed - at 24 units daily currently.   • Continue Trulicity, metformin, and Jardiance.    - Lifestyle changes:  • Continue to focus on diet portion size and quality of food rather than quantity.   • Continue current exercise regimen.    Follow Up:  1 months    Whit Perez, PharmD    CC:   Maegan Ochoa M.D.  MD Jasiel Meyer, PharmD

## 2022-07-11 DIAGNOSIS — E11.9 TYPE 2 DIABETES MELLITUS WITHOUT COMPLICATION, WITHOUT LONG-TERM CURRENT USE OF INSULIN (HCC): ICD-10-CM

## 2022-07-11 NOTE — PROGRESS NOTES
Patient needs insulin refilled in quantity of 6 pens for appropriate insurance coverage for 90-day supply.  Rx sent to pharmacy on file.    Ines Chakraborty MD

## 2022-07-29 ENCOUNTER — NON-PROVIDER VISIT (OUTPATIENT)
Dept: VASCULAR LAB | Facility: MEDICAL CENTER | Age: 52
End: 2022-07-29
Attending: INTERNAL MEDICINE
Payer: COMMERCIAL

## 2022-07-29 VITALS — DIASTOLIC BLOOD PRESSURE: 90 MMHG | HEART RATE: 81 BPM | SYSTOLIC BLOOD PRESSURE: 134 MMHG

## 2022-07-29 DIAGNOSIS — E11.9 TYPE 2 DIABETES MELLITUS WITHOUT COMPLICATION, WITHOUT LONG-TERM CURRENT USE OF INSULIN (HCC): ICD-10-CM

## 2022-07-29 PROCEDURE — 99212 OFFICE O/P EST SF 10 MIN: CPT

## 2022-07-29 RX ORDER — PHENOL 1.4 %
20 AEROSOL, SPRAY (ML) MUCOUS MEMBRANE NIGHTLY PRN
COMMUNITY

## 2022-07-29 RX ORDER — DULAGLUTIDE 3 MG/.5ML
INJECTION, SOLUTION SUBCUTANEOUS
Qty: 6 ML | Refills: 1 | Status: SHIPPED | OUTPATIENT
Start: 2022-07-29 | End: 2022-11-03 | Stop reason: SDUPTHER

## 2022-08-18 ENCOUNTER — OFFICE VISIT (OUTPATIENT)
Dept: MEDICAL GROUP | Facility: CLINIC | Age: 52
End: 2022-08-18
Payer: COMMERCIAL

## 2022-08-18 VITALS — WEIGHT: 205.4 LBS | BODY MASS INDEX: 32.24 KG/M2 | HEIGHT: 67 IN

## 2022-08-18 DIAGNOSIS — I10 ESSENTIAL HYPERTENSION, BENIGN: ICD-10-CM

## 2022-08-18 DIAGNOSIS — I10 ESSENTIAL HYPERTENSION: ICD-10-CM

## 2022-08-18 DIAGNOSIS — E11.9 TYPE 2 DIABETES MELLITUS WITHOUT COMPLICATION, WITHOUT LONG-TERM CURRENT USE OF INSULIN (HCC): ICD-10-CM

## 2022-08-18 DIAGNOSIS — I10 PRIMARY HYPERTENSION: ICD-10-CM

## 2022-08-18 PROCEDURE — 99214 OFFICE O/P EST MOD 30 MIN: CPT | Performed by: STUDENT IN AN ORGANIZED HEALTH CARE EDUCATION/TRAINING PROGRAM

## 2022-08-18 RX ORDER — LOSARTAN POTASSIUM 100 MG/1
100 TABLET ORAL DAILY
Qty: 90 TABLET | Refills: 2 | Status: SHIPPED | OUTPATIENT
Start: 2022-08-18 | End: 2022-10-18 | Stop reason: SDUPTHER

## 2022-08-18 RX ORDER — EMPAGLIFLOZIN 25 MG/1
1 TABLET, FILM COATED ORAL DAILY
Qty: 90 TABLET | Refills: 3 | Status: SHIPPED | OUTPATIENT
Start: 2022-08-18 | End: 2022-10-18 | Stop reason: SDUPTHER

## 2022-08-18 RX ORDER — HYDROCHLOROTHIAZIDE 25 MG/1
25 TABLET ORAL DAILY
Qty: 90 TABLET | Refills: 3 | Status: SHIPPED | OUTPATIENT
Start: 2022-08-18 | End: 2022-10-18 | Stop reason: SDUPTHER

## 2022-08-18 NOTE — ASSESSMENT & PLAN NOTE
Willa is having great success with recent medication adjustments and her efforts to engage in regular physical activity and follow a heart-healthy diet.   Continue medications as below, regular physical activity, and healthy choices related to diet  Insulin glargine 30 units QHS  Trulicity  Metformin  A1C 7.7%, down from 10.3% in March  CMP, urine alb/cr ratio done in April  Has seen ophthalmologist in past year  /76  Monofilament exam wnl  RTC in 3 months or sooner PRN

## 2022-08-19 ASSESSMENT — ENCOUNTER SYMPTOMS
VOMITING: 0
FOCAL WEAKNESS: 0
SPEECH CHANGE: 0
EYE REDNESS: 0
FEVER: 0
BLURRED VISION: 0
SHORTNESS OF BREATH: 0
HEADACHES: 0
ABDOMINAL PAIN: 0
DIZZINESS: 0
COUGH: 0
DOUBLE VISION: 0
EYE DISCHARGE: 0

## 2022-08-19 NOTE — PROGRESS NOTES
"Subjective:     CC: DM f/u    HPI:   Willa presents today with    Problem   Type 2 Diabetes Mellitus Without Complications (Hcc)    Diagnosed >20 years ago  Strong family history; patient's mother  from complications related to diabetes.  Current Meds:   Metformin ER 1,000 mg BID  Empagliflozin 25 mg daily  Liraglutide 3 mg SC Q week  Insulin glargine 30 units nighttime (started 2022)  Lifestyle: Regular exercise program since 2021, since last visit has increased from ~30 minutes activity per day to ~60 minutes physical activity per day. Enjoys hiking and spending time outdoors.  Reports ketogenic diet  A1C 7.7% today, down from  10.3% on 3/3/2022   Ophtho exam: recent exam (spring 2022) per patient with no concerning changes related to diabetes  Monofilament exam: done today  Lipid panel: ; patient does not tolerate statin medications  BP: at goal    Home BGs random and with improvement, ~50% or more of days with fasting BG at goal of          Hypertension    BP well controlled, 124/76 today  Denies new H/A, vision changes, CP, dyspnea, or peripheral edema  Taking losartan 100 mg, HCTZ 25 mg, and amlodipine 5 mg daily         ROS:  Review of Systems   Constitutional:  Negative for fever.   Eyes:  Negative for blurred vision, double vision, discharge and redness.   Respiratory:  Negative for cough and shortness of breath.    Cardiovascular:  Negative for chest pain and leg swelling.   Gastrointestinal:  Negative for abdominal pain and vomiting.   Neurological:  Negative for dizziness, speech change, focal weakness and headaches.     Objective:     Exam:  BP (P) 124/76 (BP Location: Right arm, Patient Position: Sitting, BP Cuff Size: Adult)   Pulse (P) 90   Ht 1.689 m (5' 6.5\")   Wt 93.2 kg (205 lb 6.4 oz)   LMP 2002   SpO2 (P) 95%   BMI 32.66 kg/m²  Body mass index is 32.66 kg/m².    Physical Exam  Constitutional:       General: She is not in acute distress.     " Appearance: Normal appearance.   HENT:      Head: Normocephalic and atraumatic.      Right Ear: External ear normal.      Left Ear: External ear normal.   Eyes:      Conjunctiva/sclera: Conjunctivae normal.   Cardiovascular:      Rate and Rhythm: Normal rate and regular rhythm.      Pulses: Normal pulses.           Dorsalis pedis pulses are 2+ on the right side and 2+ on the left side.        Posterior tibial pulses are 2+ on the right side and 2+ on the left side.      Heart sounds: Normal heart sounds. No murmur heard.  Pulmonary:      Effort: Pulmonary effort is normal. No respiratory distress.      Breath sounds: Normal breath sounds. No wheezing, rhonchi or rales.   Abdominal:      General: Abdomen is flat. There is no distension.   Musculoskeletal:         General: No swelling or deformity.      Right foot: Normal range of motion. No deformity.      Left foot: Normal range of motion. No deformity.   Feet:      Right foot:      Protective Sensation: 5 sites tested.  5 sites sensed.      Skin integrity: Callus present. No ulcer, blister or skin breakdown.      Toenail Condition: Right toenails are normal.      Left foot:      Protective Sensation: 5 sites tested.  5 sites sensed.      Skin integrity: Callus present. No ulcer, blister or skin breakdown.      Toenail Condition: Left toenails are normal.   Skin:     General: Skin is warm and dry.      Capillary Refill: Capillary refill takes less than 2 seconds.      Findings: No lesion or rash.   Neurological:      General: No focal deficit present.      Mental Status: She is alert.      Sensory: No sensory deficit.      Motor: No weakness.   Psychiatric:         Mood and Affect: Mood normal.         Behavior: Behavior normal.         Thought Content: Thought content normal.         Judgment: Judgment normal.         Assessment & Plan:     52 y.o. female with the following -     Problem List Items Addressed This Visit       Type 2 diabetes mellitus without  complications (HCC)     Willa is having great success with recent medication adjustments and her efforts to engage in regular physical activity and follow a heart-healthy diet.   Continue medications as below, regular physical activity, and healthy choices related to diet  Insulin glargine 30 units QHS  Trulicity  Metformin  A1C 7.7%, down from 10.3% in March  CMP, urine alb/cr ratio done in April  Has seen ophthalmologist in past year  /76  Monofilament exam wnl  RTC in 3 months or sooner PRN         Relevant Medications    Empagliflozin (JARDIANCE) 25 MG Tab    Hypertension     Continue current medications, regular physical activity, and healthy eating choices.  RTC in 3 months or PRN         Relevant Medications    hydroCHLOROthiazide (HYDRODIURIL) 25 MG Tab    losartan (COZAAR) 100 MG Tab     Other Visit Diagnoses       Essential hypertension        Relevant Medications    hydroCHLOROthiazide (HYDRODIURIL) 25 MG Tab    losartan (COZAAR) 100 MG Tab    Essential hypertension, benign        Relevant Medications    hydroCHLOROthiazide (HYDRODIURIL) 25 MG Tab    losartan (COZAAR) 100 MG Tab            RTC in 3 months or sooner PRN

## 2022-08-19 NOTE — ASSESSMENT & PLAN NOTE
Continue current medications, regular physical activity, and healthy eating choices.  RTC in 3 months or PRN

## 2022-09-08 RX ORDER — GEMFIBROZIL 600 MG/1
600 TABLET, FILM COATED ORAL 2 TIMES DAILY
Qty: 180 TABLET | Refills: 3 | Status: SHIPPED | OUTPATIENT
Start: 2022-09-08 | End: 2022-10-18 | Stop reason: SDUPTHER

## 2022-09-09 ENCOUNTER — NON-PROVIDER VISIT (OUTPATIENT)
Dept: VASCULAR LAB | Facility: MEDICAL CENTER | Age: 52
End: 2022-09-09
Attending: INTERNAL MEDICINE
Payer: COMMERCIAL

## 2022-09-09 PROCEDURE — 99212 OFFICE O/P EST SF 10 MIN: CPT

## 2022-09-09 NOTE — PROGRESS NOTES
Patient Consult Note    Primary care physician: Maegan Ochoa M.D.    Reason for consult: Management of Uncontrolled Type 2 Diabetes    HPI:  Willa Haywood is a 52 y.o. old patient who comes in today for evaluation of above stated problem.    Most Recent HbA1c and POCT glucose:   Lab Results   Component Value Date/Time    HBA1C 9.3 (A) 05/27/2022 12:00 AM    A1c in office of 7.7 from Dr Chakraborty note 8/18. Not reported in results in epic.        Most Recent Scr:  Lab Results   Component Value Date/Time    CREATININE 0.66 04/16/2022 08:15 AM    CREATININE 0.57 06/26/2009 10:17 AM        Current Diabetes Medication Regimen  Metformin: ER Take 1000 mg twice daily   GLP-1 Agent: Trulicity 3.0 mg weekly  SGLT-2 Inhibitor: Jardiance 25 mg    Basal Insulin: Lantus 30 units       Pt has home glucometer and proper testing technique - yes    Pt reports blood sugars:   Before Breakfast: 100-189    Hypoglycemia awareness - yes  Nocturnal hypoglycemia- none  Hypoglycemia:  None    Pt's treatment of Hypoglycemia - pt educated  - 15:15 Rule    Current Exercise - Continues dance video, bike, 300 minutes of zone training per [employer]  Exercise Goal - Moderate intensity exercise 5 times weekly for 30 minutes or more    Dietary - keto diet, low fat protein.    Foot Exam:  Monofilament exam - up to date, next 8/2023    Preventative Management  BP regimen (ACE/ARB) - Losartan 100 mg  ASA - 81 mg daily  Statin - NA  Last Retinal Scan - 4/2022  Last Foot Exam - 8/2022  Last A1c - 8/2022  Lab Results   Component Value Date/Time    HBA1C 9.3 (A) 05/27/2022 12:00 AM      Last Microalbuminuria - 4/2022    Past Medical History:  Patient Active Problem List    Diagnosis Date Noted    Genetic screening 05/17/2022    Chest wall contusion, left, subsequent encounter 01/07/2022    Contusion, buttock, subsequent encounter 01/07/2022    Fall due to slipping on ice or snow 12/29/2021    Rib pain on left side 12/29/2021    Pain in left buttock  12/29/2021    Herpes simplex infection of skin 07/13/2021    Laceration without foreign body, right foot, initial encounter 08/01/2019    Encounter for general adult medical examination without abnormal findings 08/02/2018    Sciatica 08/02/2018    Albuminuria manifested in a patient with type 2 DM 12/15/2013    Vitamin D deficiency 05/01/2012    Dyslipidemia 02/09/2012    S/P partial hysterectomy 06/26/2009    Type 2 diabetes mellitus without complications (HCC) 06/26/2009    Obesity 06/26/2009    Hypertension 06/26/2009    Polycystic ovarian syndrome 06/26/2009       Past Surgical History:  Past Surgical History:   Procedure Laterality Date    KNEE ARTHROSCOPY  october 2013    right medial meniscus repair       Allergies:  Hmg-coa-r inhibitors, Other drug, Enalapril, Lovastatin, and Lisinopril    Social History:  Social History     Socioeconomic History    Marital status:      Spouse name: Not on file    Number of children: Not on file    Years of education: Not on file    Highest education level: Not on file   Occupational History    Not on file   Tobacco Use    Smoking status: Former    Smokeless tobacco: Never    Tobacco comments:     TEEN   Vaping Use    Vaping Use: Never used   Substance and Sexual Activity    Alcohol use: Yes     Comment: RARELY    Drug use: No    Sexual activity: Not on file     Comment:    Other Topics Concern    Not on file   Social History Narrative    Not on file     Social Determinants of Health     Financial Resource Strain: Not on file   Food Insecurity: Not on file   Transportation Needs: Not on file   Physical Activity: Not on file   Stress: Not on file   Social Connections: Not on file   Intimate Partner Violence: Not on file   Housing Stability: Not on file       Family History:  No family history on file.    Medications:    Current Outpatient Medications:     gemfibrozil (LOPID) 600 MG Tab, Take 1 Tablet by mouth 2 times a day., Disp: 180 Tablet, Rfl: 3     Empagliflozin (JARDIANCE) 25 MG Tab, Take 1 Tablet by mouth every day., Disp: 90 Tablet, Rfl: 3    hydroCHLOROthiazide (HYDRODIURIL) 25 MG Tab, Take 1 Tablet by mouth every day., Disp: 90 Tablet, Rfl: 3    losartan (COZAAR) 100 MG Tab, Take 1 Tablet by mouth every day., Disp: 90 Tablet, Rfl: 2    Dulaglutide (TRULICITY) 3 MG/0.5ML Solution Pen-injector, Inject 3 mg subQ every 7 days., Disp: 6 mL, Rfl: 1    Melatonin 10 MG Tab, Take 20 mg by mouth at bedtime as needed., Disp: , Rfl:     Insulin Glargine, 1 Unit Dial, 300 UNIT/ML Solution Pen-injector, Inject 26 Units under the skin at bedtime. (Patient taking differently: Inject 30 Units under the skin at bedtime.), Disp: 6 Each, Rfl: 1    Blood Glucose Test Strips, Test strips order: Test strips for One Touch Ultra Mini meter. Sig: use daily and prn ssx high or low sugar #100 RF x 3, Disp: 100 Strip, Rfl: 3    Insulin Pen Needle 31G X 5 MM Misc, 1 Units at bedtime. Use with Insulin pen as prescribed, Disp: 100 Each, Rfl: 3    amlodipine (NORVASC) 5 MG Tab, TAKE ONE TABLET BY MOUTH ONE TIME DAILY, Disp: 90 Tab, Rfl: 2    metformin ER (GLUCOPHAGE XR) 500 MG TABLET SR 24 HR, Take 2 Tabs by mouth 2 times a day., Disp: 360 Tab, Rfl: 3    aspirin EC (ECOTRIN) 325 MG TBEC, Take 325 mg by mouth every day., Disp: , Rfl:     cholecalciferol (VITAMIN D) 400 UNIT CAPS, Take 800 Units by mouth every day., Disp: , Rfl:     ASCENSIA MICROFILL TEST strip, TEST 3 X DAILY AS DIRECTED, Disp: 100 Each, Rfl: 6    Red Yeast Rice 600 MG TABS, Take  by mouth 2 Times a Day., Disp: , Rfl:     LORATADINE, 10 mg every day. PRN / OTC, Disp: , Rfl:     FISH OIL 1000 MG PO CAPS, Take 4,000 mg by mouth every morning. FISH OIL /OTC, Disp: , Rfl:     Labs: Reviewed    Physical Examination:  Vital signs: LMP 01/09/2002  There is no height or weight on file to calculate BMI.    Assessment and Plan:    1. DM2  Basic physiology of DMII was explained to patient as well as microvascular/macrovascular  complications. The importance of increasing physical activity to improve diabetes control was discussed with the patient. Patient was also educated on changing diet and making better choices to help control blood sugar.  Discussed Goals: FBG 80 - 130, 2hPP < 180, a1c < 7.0%  Tolerating increase of Trulicity without issues  A1c at next visit in December    - Medication changes:  None, continue current regimen. If A1C continues to be elevated at next appointment may consider further increase to long acting insulin    - Lifestyle changes:  Continue to improve diet and increase vegetable intake, low fat protein  Continue exercise regimen    Follow Up:  3 months    Archana Otero, GaelD    CC:   Maegan Ochoa M.D.

## 2022-09-27 DIAGNOSIS — E11.65 TYPE 2 DIABETES MELLITUS WITH HYPERGLYCEMIA, WITHOUT LONG-TERM CURRENT USE OF INSULIN (HCC): ICD-10-CM

## 2022-09-27 DIAGNOSIS — I10 ESSENTIAL HYPERTENSION, BENIGN: ICD-10-CM

## 2022-09-27 RX ORDER — METFORMIN HYDROCHLORIDE 500 MG/1
1000 TABLET, EXTENDED RELEASE ORAL DAILY
Qty: 180 TABLET | Refills: 3 | Status: SHIPPED | OUTPATIENT
Start: 2022-09-27 | End: 2022-10-04 | Stop reason: SDUPTHER

## 2022-09-27 RX ORDER — AMLODIPINE BESYLATE 5 MG/1
5 TABLET ORAL DAILY
Qty: 90 TABLET | Refills: 3 | Status: SHIPPED | OUTPATIENT
Start: 2022-09-27 | End: 2022-10-18 | Stop reason: SDUPTHER

## 2022-10-04 DIAGNOSIS — E11.65 TYPE 2 DIABETES MELLITUS WITH HYPERGLYCEMIA, WITHOUT LONG-TERM CURRENT USE OF INSULIN (HCC): ICD-10-CM

## 2022-10-04 RX ORDER — METFORMIN HYDROCHLORIDE 500 MG/1
2000 TABLET, EXTENDED RELEASE ORAL DAILY
Qty: 360 TABLET | Refills: 3 | Status: SHIPPED | OUTPATIENT
Start: 2022-10-04 | End: 2022-10-18 | Stop reason: SDUPTHER

## 2022-10-25 DIAGNOSIS — E11.9 TYPE 2 DIABETES MELLITUS WITHOUT COMPLICATION, WITHOUT LONG-TERM CURRENT USE OF INSULIN (HCC): ICD-10-CM

## 2022-10-27 RX ORDER — INSULIN GLARGINE 100 [IU]/ML
30 INJECTION, SOLUTION SUBCUTANEOUS EVERY EVENING
Qty: 30 ML | Refills: 3 | Status: SHIPPED | OUTPATIENT
Start: 2022-10-27 | End: 2022-11-01

## 2022-10-27 NOTE — TELEPHONE ENCOUNTER
Called [facility]. They did not have insulin glargine 300U/mL.  Thus changed verbally to 100U/mL pens.

## 2022-11-01 ENCOUNTER — TELEPHONE (OUTPATIENT)
Dept: MEDICAL GROUP | Facility: CLINIC | Age: 52
End: 2022-11-01
Payer: COMMERCIAL

## 2022-11-01 DIAGNOSIS — Z79.4 TYPE 2 DIABETES MELLITUS WITHOUT COMPLICATION, WITH LONG-TERM CURRENT USE OF INSULIN (HCC): ICD-10-CM

## 2022-11-01 DIAGNOSIS — E11.9 TYPE 2 DIABETES MELLITUS WITHOUT COMPLICATION, WITH LONG-TERM CURRENT USE OF INSULIN (HCC): ICD-10-CM

## 2022-11-01 RX ORDER — INSULIN GLARGINE 100 [IU]/ML
30 INJECTION, SOLUTION SUBCUTANEOUS EVERY EVENING
Qty: 30 ML | Refills: 3 | Status: SHIPPED | OUTPATIENT
Start: 2022-11-01 | End: 2023-01-11 | Stop reason: SDUPTHER

## 2022-11-01 NOTE — PROGRESS NOTES
Changed insulin glargine brand to basaglar kwikpen from lantus solostar pens, per formulary coverage.

## 2022-11-01 NOTE — TELEPHONE ENCOUNTER
Lantus Solostar 100 UNIT/ML Pen-Inj denied by ins. PA required     Formulary alternatives:    -Basaglar KwikPen   -HumaLOG Ancelmo KwikPen   -HumaLOG KwikPen   -HumaLOG Mix 50/50 KwikPen -HumaLOG Mix 75/25 KwikPen   -Insulin Degludec   -Insulin Degludec FlexTouch   -Insulin Lispro Prot & Lispro   -Levemir   -Levemir FlexTouch   -Lyumlaurence IzaguirrePen   -Semglee (yfgn)   -Soliqua   -Toujeo Max SoloStar   -Toujeo SoloStar   -Tresiba   -Tresiba FlexTouch   -Xultophy

## 2022-11-03 ENCOUNTER — TELEPHONE (OUTPATIENT)
Dept: MEDICAL GROUP | Facility: CLINIC | Age: 52
End: 2022-11-03
Payer: COMMERCIAL

## 2022-11-03 DIAGNOSIS — E11.9 TYPE 2 DIABETES MELLITUS WITHOUT COMPLICATION, WITHOUT LONG-TERM CURRENT USE OF INSULIN (HCC): ICD-10-CM

## 2022-11-03 RX ORDER — DULAGLUTIDE 3 MG/.5ML
INJECTION, SOLUTION SUBCUTANEOUS
Qty: 6 ML | Refills: 1 | Status: SHIPPED | OUTPATIENT
Start: 2022-11-03 | End: 2022-11-03

## 2022-11-03 RX ORDER — DULAGLUTIDE 3 MG/.5ML
0.5 INJECTION, SOLUTION SUBCUTANEOUS
Qty: 6 ML | Refills: 1 | Status: SHIPPED | OUTPATIENT
Start: 2022-11-03 | End: 2023-04-13 | Stop reason: SDUPTHER

## 2022-11-03 NOTE — TELEPHONE ENCOUNTER
I spoke with Willa today. She had multiple issues with getting her insulin glargine covered by her insurance.     The patient reports that Semglee (yfgn) did not require a PA and was eventually fulfilled for the patient. She will follow up with Dr Chakraborty on 1/05/2022 and I have advised her to bring the medication with her to the appointment so that Dr Chakraborty can help her monitor her medications without fulfillment issues.

## 2022-12-09 ENCOUNTER — APPOINTMENT (OUTPATIENT)
Dept: VASCULAR LAB | Facility: MEDICAL CENTER | Age: 52
End: 2022-12-09
Payer: COMMERCIAL

## 2023-01-11 ENCOUNTER — OFFICE VISIT (OUTPATIENT)
Dept: MEDICAL GROUP | Facility: CLINIC | Age: 53
End: 2023-01-11
Payer: COMMERCIAL

## 2023-01-11 VITALS
HEIGHT: 67 IN | BODY MASS INDEX: 31.99 KG/M2 | WEIGHT: 203.8 LBS | SYSTOLIC BLOOD PRESSURE: 132 MMHG | DIASTOLIC BLOOD PRESSURE: 76 MMHG

## 2023-01-11 DIAGNOSIS — Z12.31 ENCOUNTER FOR SCREENING MAMMOGRAM FOR BREAST CANCER: ICD-10-CM

## 2023-01-11 DIAGNOSIS — E66.09 CLASS 1 OBESITY DUE TO EXCESS CALORIES WITH BODY MASS INDEX (BMI) OF 32.0 TO 32.9 IN ADULT, UNSPECIFIED WHETHER SERIOUS COMORBIDITY PRESENT: ICD-10-CM

## 2023-01-11 DIAGNOSIS — E11.9 TYPE 2 DIABETES MELLITUS WITHOUT COMPLICATION, WITHOUT LONG-TERM CURRENT USE OF INSULIN (HCC): ICD-10-CM

## 2023-01-11 DIAGNOSIS — Z23 ENCOUNTER FOR IMMUNIZATION: ICD-10-CM

## 2023-01-11 DIAGNOSIS — I10 PRIMARY HYPERTENSION: ICD-10-CM

## 2023-01-11 DIAGNOSIS — E78.5 DYSLIPIDEMIA: ICD-10-CM

## 2023-01-11 DIAGNOSIS — Z79.4 TYPE 2 DIABETES MELLITUS WITHOUT COMPLICATION, WITH LONG-TERM CURRENT USE OF INSULIN (HCC): ICD-10-CM

## 2023-01-11 DIAGNOSIS — E11.9 TYPE 2 DIABETES MELLITUS WITHOUT COMPLICATION, WITH LONG-TERM CURRENT USE OF INSULIN (HCC): ICD-10-CM

## 2023-01-11 LAB
HBA1C MFR BLD: 7.4 % (ref 0–5.6)
INT CON NEG: ABNORMAL
INT CON POS: ABNORMAL

## 2023-01-11 PROCEDURE — 0134A MODERNA SARS-COV-2 VACCINE BIVALENT BOOSTER (12+): CPT | Performed by: STUDENT IN AN ORGANIZED HEALTH CARE EDUCATION/TRAINING PROGRAM

## 2023-01-11 PROCEDURE — 99214 OFFICE O/P EST MOD 30 MIN: CPT | Mod: 25 | Performed by: STUDENT IN AN ORGANIZED HEALTH CARE EDUCATION/TRAINING PROGRAM

## 2023-01-11 PROCEDURE — 90471 IMMUNIZATION ADMIN: CPT | Performed by: STUDENT IN AN ORGANIZED HEALTH CARE EDUCATION/TRAINING PROGRAM

## 2023-01-11 PROCEDURE — 90686 IIV4 VACC NO PRSV 0.5 ML IM: CPT | Performed by: STUDENT IN AN ORGANIZED HEALTH CARE EDUCATION/TRAINING PROGRAM

## 2023-01-11 PROCEDURE — 83036 HEMOGLOBIN GLYCOSYLATED A1C: CPT | Performed by: STUDENT IN AN ORGANIZED HEALTH CARE EDUCATION/TRAINING PROGRAM

## 2023-01-11 PROCEDURE — 91313 MODERNA SARS-COV-2 VACCINE BIVALENT BOOSTER (12+): CPT | Performed by: STUDENT IN AN ORGANIZED HEALTH CARE EDUCATION/TRAINING PROGRAM

## 2023-01-11 RX ORDER — INSULIN GLARGINE-YFGN 100 [IU]/ML
30 INJECTION, SOLUTION SUBCUTANEOUS
Qty: 30 ML | Refills: 3 | Status: SHIPPED | OUTPATIENT
Start: 2023-01-11

## 2023-01-11 ASSESSMENT — ENCOUNTER SYMPTOMS
SPEECH CHANGE: 0
ABDOMINAL PAIN: 0
NAUSEA: 1
FEVER: 0
PALPITATIONS: 0
CHILLS: 0
FOCAL WEAKNESS: 0
BLURRED VISION: 0

## 2023-01-11 NOTE — ASSESSMENT & PLAN NOTE
No med changes today, continue as prescribed  Ordered CMP, lipid panel, and urine microalbumin/cr ratio to be done in about 3 months  Ophtho referral ordered, due for exam in about 3 months  Continue regular exercise  Glargine refilled  Plan to review BGs in 2-4 weeks and reconsider glimepiride. Discussed risks of hypoglycemia especially in setting of insulin use.    no

## 2023-01-11 NOTE — ASSESSMENT & PLAN NOTE
Encouraged patient to continue with behavioral changes she is already made.  Discussed lifestyle medicine curriculum and plan to start obesity group visits.  Patient expresses interest in this.

## 2023-01-24 ENCOUNTER — HOSPITAL ENCOUNTER (OUTPATIENT)
Dept: RADIOLOGY | Facility: MEDICAL CENTER | Age: 53
End: 2023-01-24
Attending: STUDENT IN AN ORGANIZED HEALTH CARE EDUCATION/TRAINING PROGRAM
Payer: COMMERCIAL

## 2023-01-24 DIAGNOSIS — Z12.31 ENCOUNTER FOR SCREENING MAMMOGRAM FOR BREAST CANCER: ICD-10-CM

## 2023-01-24 PROCEDURE — 77063 BREAST TOMOSYNTHESIS BI: CPT

## 2023-02-09 RX ORDER — FLASH GLUCOSE SENSOR
1 KIT MISCELLANEOUS DAILY
Qty: 6 EACH | Refills: 3 | Status: SHIPPED | OUTPATIENT
Start: 2023-02-09 | End: 2024-02-04

## 2023-02-09 RX ORDER — GLIMEPIRIDE 2 MG/1
2 TABLET ORAL 2 TIMES DAILY
Qty: 60 TABLET | Refills: 1 | Status: SHIPPED | OUTPATIENT
Start: 2023-02-09 | End: 2023-04-09 | Stop reason: SDUPTHER

## 2023-04-10 RX ORDER — GLIMEPIRIDE 2 MG/1
2 TABLET ORAL 2 TIMES DAILY
Qty: 60 TABLET | Refills: 1 | Status: SHIPPED | OUTPATIENT
Start: 2023-04-10 | End: 2023-06-06 | Stop reason: SDUPTHER

## 2023-04-13 DIAGNOSIS — E11.9 TYPE 2 DIABETES MELLITUS WITHOUT COMPLICATION, WITHOUT LONG-TERM CURRENT USE OF INSULIN (HCC): ICD-10-CM

## 2023-04-13 RX ORDER — DULAGLUTIDE 3 MG/.5ML
0.5 INJECTION, SOLUTION SUBCUTANEOUS
Qty: 6 ML | Refills: 3 | Status: SHIPPED | OUTPATIENT
Start: 2023-04-13 | End: 2024-01-16 | Stop reason: SDUPTHER

## 2023-06-30 ENCOUNTER — OFFICE VISIT (OUTPATIENT)
Dept: URGENT CARE | Facility: CLINIC | Age: 53
End: 2023-06-30
Payer: COMMERCIAL

## 2023-06-30 VITALS
RESPIRATION RATE: 16 BRPM | WEIGHT: 200 LBS | HEART RATE: 68 BPM | TEMPERATURE: 97.5 F | DIASTOLIC BLOOD PRESSURE: 80 MMHG | SYSTOLIC BLOOD PRESSURE: 110 MMHG | HEIGHT: 67 IN | OXYGEN SATURATION: 97 % | BODY MASS INDEX: 31.39 KG/M2

## 2023-06-30 DIAGNOSIS — L03.90 CELLULITIS OF SKIN: ICD-10-CM

## 2023-06-30 DIAGNOSIS — T14.8XXA BLISTERING OF SKIN: ICD-10-CM

## 2023-06-30 PROCEDURE — 3079F DIAST BP 80-89 MM HG: CPT | Performed by: PHYSICIAN ASSISTANT

## 2023-06-30 PROCEDURE — 99213 OFFICE O/P EST LOW 20 MIN: CPT | Performed by: PHYSICIAN ASSISTANT

## 2023-06-30 PROCEDURE — 3074F SYST BP LT 130 MM HG: CPT | Performed by: PHYSICIAN ASSISTANT

## 2023-06-30 RX ORDER — DOXYCYCLINE 100 MG/1
100 CAPSULE ORAL 2 TIMES DAILY
Qty: 10 CAPSULE | Refills: 0 | Status: SHIPPED | OUTPATIENT
Start: 2023-06-30 | End: 2023-07-05

## 2023-06-30 ASSESSMENT — ENCOUNTER SYMPTOMS
RESPIRATORY NEGATIVE: 1
CARDIOVASCULAR NEGATIVE: 1
NEUROLOGICAL NEGATIVE: 1
CONSTITUTIONAL NEGATIVE: 1
MUSCULOSKELETAL NEGATIVE: 1

## 2023-06-30 NOTE — PROGRESS NOTES
"  Subjective:     Willa Haywood  is a 53 y.o. female who presents for Blisters (X last night, blisters on Lt. Foot, swelling and painful.  Patient is diabetic. )       She presents today with localized redness swelling and blistering present over the left first big toe.  States that symptoms began within the last 24 hours.  Patient denies any specific trauma or injury associated with symptom onset.  She denies getting sunburn over the affected area.  She denies fevers, no chest pain or shortness of breath.  She does have previous history of diabetes.  No pain with moving the big toe.  No pain with walking or weightbearing.  No numbness and tingling over the toe.  Patient has been doing Epsom salt bath soaks.       Review of Systems   Constitutional: Negative.    Respiratory: Negative.     Cardiovascular: Negative.    Musculoskeletal: Negative.    Skin:  Positive for rash.   Neurological: Negative.       Allergies   Allergen Reactions    Hmg-Coa-R Inhibitors      Other reaction(s): leg cramps    Other Drug      Other reaction(s): leg cramps    Enalapril      cough    Lovastatin      Leg cramps    Lisinopril      Other reaction(s): cough, scratchy throat  Other reaction(s): cough, scratchy throat     Past Medical History:   Diagnosis Date    H/O: hysterectomy 6/26/2009    Hypercholesterolemia 6/26/2009    Hypertension 6/26/2009    Morbid obesity (HCC) 6/26/2009    Polycystic ovarian syndrome 6/26/2009    S/P partial hysterectomy 6/26/2009    Type II or unspecified type diabetes mellitus without mention of complication, uncontrolled 6/26/2009        Objective:   /80 (BP Location: Left arm, Patient Position: Sitting, BP Cuff Size: Large adult)   Pulse 68   Temp 36.4 °C (97.5 °F) (Temporal)   Resp 16   Ht 1.689 m (5' 6.5\")   Wt 90.7 kg (200 lb)   LMP 01/09/2002   SpO2 97%   BMI 31.80 kg/m²   Physical Exam  Vitals and nursing note reviewed.   Constitutional:       General: She is not in acute " distress.     Appearance: She is not ill-appearing or toxic-appearing.   HENT:      Head: Normocephalic.      Nose: No rhinorrhea.   Eyes:      General: No scleral icterus.     Conjunctiva/sclera: Conjunctivae normal.   Pulmonary:      Effort: Pulmonary effort is normal. No respiratory distress.      Breath sounds: No stridor.   Musculoskeletal:      Cervical back: Neck supple.        Feet:    Feet:      Comments: There is a well demarcated area of erythema present over the above marked regions of the left foot (red border of drawing) and 2 dense bullae present within the area of erythema (red circles).  There is mild tenderness palpation over the affected skin.  No drainage from the bullae.  Toe range of motion sensation and distal capillary refill remains intact and comparable bilaterally.  No obvious skin trauma.  No lymphatic streaking  Neurological:      Mental Status: She is alert and oriented to person, place, and time.   Psychiatric:         Mood and Affect: Mood normal.         Behavior: Behavior normal.         Thought Content: Thought content normal.         Judgment: Judgment normal.             Diagnostic testing: None    Assessment/Plan:     Encounter Diagnoses   Name Primary?    Cellulitis of skin     Blistering of skin           Plan for care for today's complaint includes starting the patient on doxycycline for suspected cellulitis of the left great toe.  Discussed with the patient to withhold from actively trying to drain the bullae, they will drain spontaneously on their own.  Continue absence of baths.  Continue to monitor symptoms and return to urgent care or follow-up with primary care provider if symptoms remain ongoing.  Follow-up in the emergency department if symptoms become severe, ER precautions discussed in office today..  Prescription for doxycycline provided.    See AVS Instructions below for written guidance provided to patient on after-visit management and care in addition to our  verbal discussion during the visit.    Please note that this dictation was created using voice recognition software. I have attempted to correct all errors, but there may be sound-alike, spelling, grammar and possibly content errors that I did not discover before finalizing the note.    Ez Luke PA-C

## 2023-08-30 DIAGNOSIS — I10 ESSENTIAL HYPERTENSION, BENIGN: ICD-10-CM

## 2023-08-30 RX ORDER — LOSARTAN POTASSIUM 100 MG/1
100 TABLET ORAL DAILY
Qty: 90 TABLET | Refills: 2 | Status: SHIPPED | OUTPATIENT
Start: 2023-08-30

## 2023-09-28 RX ORDER — EMPAGLIFLOZIN 25 MG/1
1 TABLET, FILM COATED ORAL DAILY
Qty: 90 TABLET | Refills: 3 | Status: SHIPPED | OUTPATIENT
Start: 2023-09-28 | End: 2024-01-01 | Stop reason: SDUPTHER

## 2023-10-03 DIAGNOSIS — E11.9 TYPE 2 DIABETES MELLITUS WITHOUT COMPLICATION, WITHOUT LONG-TERM CURRENT USE OF INSULIN (HCC): ICD-10-CM

## 2023-11-20 DIAGNOSIS — I10 ESSENTIAL HYPERTENSION: ICD-10-CM

## 2023-11-20 DIAGNOSIS — E11.65 TYPE 2 DIABETES MELLITUS WITH HYPERGLYCEMIA, WITHOUT LONG-TERM CURRENT USE OF INSULIN (HCC): ICD-10-CM

## 2023-11-21 RX ORDER — HYDROCHLOROTHIAZIDE 25 MG/1
25 TABLET ORAL DAILY
Qty: 90 TABLET | Refills: 1 | Status: SHIPPED | OUTPATIENT
Start: 2023-11-21

## 2023-11-21 RX ORDER — METFORMIN HYDROCHLORIDE 500 MG/1
2000 TABLET, EXTENDED RELEASE ORAL DAILY
Qty: 360 TABLET | Refills: 1 | Status: SHIPPED | OUTPATIENT
Start: 2023-11-21

## 2023-11-29 ENCOUNTER — HOSPITAL ENCOUNTER (OUTPATIENT)
Dept: LAB | Facility: MEDICAL CENTER | Age: 53
End: 2023-11-29
Attending: STUDENT IN AN ORGANIZED HEALTH CARE EDUCATION/TRAINING PROGRAM
Payer: COMMERCIAL

## 2023-11-29 DIAGNOSIS — E11.9 TYPE 2 DIABETES MELLITUS WITHOUT COMPLICATION, WITH LONG-TERM CURRENT USE OF INSULIN (HCC): ICD-10-CM

## 2023-11-29 DIAGNOSIS — Z79.4 TYPE 2 DIABETES MELLITUS WITHOUT COMPLICATION, WITH LONG-TERM CURRENT USE OF INSULIN (HCC): ICD-10-CM

## 2023-11-29 LAB
ALBUMIN SERPL BCP-MCNC: 4.2 G/DL (ref 3.2–4.9)
ALBUMIN/GLOB SERPL: 1.5 G/DL
ALP SERPL-CCNC: 48 U/L (ref 30–99)
ALT SERPL-CCNC: 23 U/L (ref 2–50)
ANION GAP SERPL CALC-SCNC: 10 MMOL/L (ref 7–16)
AST SERPL-CCNC: 13 U/L (ref 12–45)
BILIRUB SERPL-MCNC: 0.3 MG/DL (ref 0.1–1.5)
BUN SERPL-MCNC: 19 MG/DL (ref 8–22)
CALCIUM ALBUM COR SERPL-MCNC: 9.1 MG/DL (ref 8.5–10.5)
CALCIUM SERPL-MCNC: 9.3 MG/DL (ref 8.5–10.5)
CHLORIDE SERPL-SCNC: 103 MMOL/L (ref 96–112)
CHOLEST SERPL-MCNC: 183 MG/DL (ref 100–199)
CO2 SERPL-SCNC: 27 MMOL/L (ref 20–33)
CREAT SERPL-MCNC: 0.54 MG/DL (ref 0.5–1.4)
CREAT UR-MCNC: 94.47 MG/DL
FASTING STATUS PATIENT QL REPORTED: NORMAL
GFR SERPLBLD CREATININE-BSD FMLA CKD-EPI: 108 ML/MIN/1.73 M 2
GLOBULIN SER CALC-MCNC: 2.8 G/DL (ref 1.9–3.5)
GLUCOSE SERPL-MCNC: 149 MG/DL (ref 65–99)
HDLC SERPL-MCNC: 49 MG/DL
LDLC SERPL CALC-MCNC: 114 MG/DL
MICROALBUMIN UR-MCNC: <1.2 MG/DL
MICROALBUMIN/CREAT UR: NORMAL MG/G (ref 0–30)
POTASSIUM SERPL-SCNC: 4 MMOL/L (ref 3.6–5.5)
PROT SERPL-MCNC: 7 G/DL (ref 6–8.2)
SODIUM SERPL-SCNC: 140 MMOL/L (ref 135–145)
TRIGL SERPL-MCNC: 102 MG/DL (ref 0–149)

## 2023-11-29 PROCEDURE — 80053 COMPREHEN METABOLIC PANEL: CPT

## 2023-11-29 PROCEDURE — 82570 ASSAY OF URINE CREATININE: CPT

## 2023-11-29 PROCEDURE — 36415 COLL VENOUS BLD VENIPUNCTURE: CPT

## 2023-11-29 PROCEDURE — 82043 UR ALBUMIN QUANTITATIVE: CPT

## 2023-11-29 PROCEDURE — 80061 LIPID PANEL: CPT

## 2023-11-29 SDOH — HEALTH STABILITY: MENTAL HEALTH
STRESS IS WHEN SOMEONE FEELS TENSE, NERVOUS, ANXIOUS, OR CAN'T SLEEP AT NIGHT BECAUSE THEIR MIND IS TROUBLED. HOW STRESSED ARE YOU?: ONLY A LITTLE

## 2023-11-29 SDOH — ECONOMIC STABILITY: TRANSPORTATION INSECURITY
IN THE PAST 12 MONTHS, HAS THE LACK OF TRANSPORTATION KEPT YOU FROM MEDICAL APPOINTMENTS OR FROM GETTING MEDICATIONS?: NO

## 2023-11-29 SDOH — ECONOMIC STABILITY: HOUSING INSECURITY
IN THE LAST 12 MONTHS, WAS THERE A TIME WHEN YOU DID NOT HAVE A STEADY PLACE TO SLEEP OR SLEPT IN A SHELTER (INCLUDING NOW)?: NO

## 2023-11-29 SDOH — ECONOMIC STABILITY: FOOD INSECURITY: WITHIN THE PAST 12 MONTHS, THE FOOD YOU BOUGHT JUST DIDN'T LAST AND YOU DIDN'T HAVE MONEY TO GET MORE.: NEVER TRUE

## 2023-11-29 SDOH — ECONOMIC STABILITY: FOOD INSECURITY: WITHIN THE PAST 12 MONTHS, YOU WORRIED THAT YOUR FOOD WOULD RUN OUT BEFORE YOU GOT MONEY TO BUY MORE.: NEVER TRUE

## 2023-11-29 SDOH — HEALTH STABILITY: PHYSICAL HEALTH: ON AVERAGE, HOW MANY DAYS PER WEEK DO YOU ENGAGE IN MODERATE TO STRENUOUS EXERCISE (LIKE A BRISK WALK)?: 4 DAYS

## 2023-11-29 SDOH — ECONOMIC STABILITY: TRANSPORTATION INSECURITY
IN THE PAST 12 MONTHS, HAS LACK OF TRANSPORTATION KEPT YOU FROM MEETINGS, WORK, OR FROM GETTING THINGS NEEDED FOR DAILY LIVING?: NO

## 2023-11-29 SDOH — ECONOMIC STABILITY: INCOME INSECURITY: IN THE LAST 12 MONTHS, WAS THERE A TIME WHEN YOU WERE NOT ABLE TO PAY THE MORTGAGE OR RENT ON TIME?: NO

## 2023-11-29 SDOH — ECONOMIC STABILITY: HOUSING INSECURITY: IN THE LAST 12 MONTHS, HOW MANY PLACES HAVE YOU LIVED?: 1

## 2023-11-29 SDOH — HEALTH STABILITY: PHYSICAL HEALTH: ON AVERAGE, HOW MANY MINUTES DO YOU ENGAGE IN EXERCISE AT THIS LEVEL?: 40 MIN

## 2023-11-29 SDOH — ECONOMIC STABILITY: TRANSPORTATION INSECURITY
IN THE PAST 12 MONTHS, HAS LACK OF RELIABLE TRANSPORTATION KEPT YOU FROM MEDICAL APPOINTMENTS, MEETINGS, WORK OR FROM GETTING THINGS NEEDED FOR DAILY LIVING?: NO

## 2023-11-29 SDOH — ECONOMIC STABILITY: INCOME INSECURITY: HOW HARD IS IT FOR YOU TO PAY FOR THE VERY BASICS LIKE FOOD, HOUSING, MEDICAL CARE, AND HEATING?: NOT HARD AT ALL

## 2023-11-29 ASSESSMENT — LIFESTYLE VARIABLES
AUDIT-C TOTAL SCORE: 2
HOW MANY STANDARD DRINKS CONTAINING ALCOHOL DO YOU HAVE ON A TYPICAL DAY: 1 OR 2
HOW OFTEN DO YOU HAVE A DRINK CONTAINING ALCOHOL: MONTHLY OR LESS
HOW OFTEN DO YOU HAVE SIX OR MORE DRINKS ON ONE OCCASION: LESS THAN MONTHLY
SKIP TO QUESTIONS 9-10: 0

## 2023-11-29 ASSESSMENT — SOCIAL DETERMINANTS OF HEALTH (SDOH)
HOW OFTEN DO YOU ATTEND CHURCH OR RELIGIOUS SERVICES?: 1 TO 4 TIMES PER YEAR
WITHIN THE PAST 12 MONTHS, YOU WORRIED THAT YOUR FOOD WOULD RUN OUT BEFORE YOU GOT THE MONEY TO BUY MORE: NEVER TRUE
HOW OFTEN DO YOU GET TOGETHER WITH FRIENDS OR RELATIVES?: ONCE A WEEK
IN A TYPICAL WEEK, HOW MANY TIMES DO YOU TALK ON THE PHONE WITH FAMILY, FRIENDS, OR NEIGHBORS?: TWICE A WEEK
HOW OFTEN DO YOU GET TOGETHER WITH FRIENDS OR RELATIVES?: ONCE A WEEK
HOW OFTEN DO YOU HAVE A DRINK CONTAINING ALCOHOL: MONTHLY OR LESS
DO YOU BELONG TO ANY CLUBS OR ORGANIZATIONS SUCH AS CHURCH GROUPS UNIONS, FRATERNAL OR ATHLETIC GROUPS, OR SCHOOL GROUPS?: YES
DO YOU BELONG TO ANY CLUBS OR ORGANIZATIONS SUCH AS CHURCH GROUPS UNIONS, FRATERNAL OR ATHLETIC GROUPS, OR SCHOOL GROUPS?: YES
HOW OFTEN DO YOU ATTEND CHURCH OR RELIGIOUS SERVICES?: 1 TO 4 TIMES PER YEAR
IN A TYPICAL WEEK, HOW MANY TIMES DO YOU TALK ON THE PHONE WITH FAMILY, FRIENDS, OR NEIGHBORS?: TWICE A WEEK
HOW HARD IS IT FOR YOU TO PAY FOR THE VERY BASICS LIKE FOOD, HOUSING, MEDICAL CARE, AND HEATING?: NOT HARD AT ALL
HOW OFTEN DO YOU ATTENT MEETINGS OF THE CLUB OR ORGANIZATION YOU BELONG TO?: MORE THAN 4 TIMES PER YEAR
HOW OFTEN DO YOU HAVE SIX OR MORE DRINKS ON ONE OCCASION: LESS THAN MONTHLY
HOW MANY DRINKS CONTAINING ALCOHOL DO YOU HAVE ON A TYPICAL DAY WHEN YOU ARE DRINKING: 1 OR 2
HOW OFTEN DO YOU ATTENT MEETINGS OF THE CLUB OR ORGANIZATION YOU BELONG TO?: MORE THAN 4 TIMES PER YEAR

## 2023-11-30 ENCOUNTER — OFFICE VISIT (OUTPATIENT)
Dept: MEDICAL GROUP | Facility: CLINIC | Age: 53
End: 2023-11-30
Payer: COMMERCIAL

## 2023-11-30 VITALS
DIASTOLIC BLOOD PRESSURE: 82 MMHG | BODY MASS INDEX: 35.06 KG/M2 | SYSTOLIC BLOOD PRESSURE: 128 MMHG | HEART RATE: 90 BPM | HEIGHT: 65 IN | WEIGHT: 210.4 LBS | TEMPERATURE: 97.5 F | OXYGEN SATURATION: 96 %

## 2023-11-30 DIAGNOSIS — Z23 NEED FOR VACCINATION: ICD-10-CM

## 2023-11-30 DIAGNOSIS — Z00.00 WELL ADULT EXAM: ICD-10-CM

## 2023-11-30 DIAGNOSIS — E78.5 DYSLIPIDEMIA: ICD-10-CM

## 2023-11-30 DIAGNOSIS — E11.9 TYPE 2 DIABETES MELLITUS WITHOUT COMPLICATION, WITH LONG-TERM CURRENT USE OF INSULIN (HCC): ICD-10-CM

## 2023-11-30 DIAGNOSIS — Z79.4 TYPE 2 DIABETES MELLITUS WITHOUT COMPLICATION, WITH LONG-TERM CURRENT USE OF INSULIN (HCC): ICD-10-CM

## 2023-11-30 DIAGNOSIS — E11.9 TYPE 2 DIABETES MELLITUS WITHOUT COMPLICATIONS (HCC): ICD-10-CM

## 2023-11-30 LAB
HBA1C MFR BLD: 7.1 % (ref ?–5.8)
POCT INT CON NEG: NEGATIVE
POCT INT CON POS: POSITIVE

## 2023-11-30 PROCEDURE — 83036 HEMOGLOBIN GLYCOSYLATED A1C: CPT | Performed by: STUDENT IN AN ORGANIZED HEALTH CARE EDUCATION/TRAINING PROGRAM

## 2023-11-30 PROCEDURE — 90472 IMMUNIZATION ADMIN EACH ADD: CPT | Performed by: STUDENT IN AN ORGANIZED HEALTH CARE EDUCATION/TRAINING PROGRAM

## 2023-11-30 PROCEDURE — 90686 IIV4 VACC NO PRSV 0.5 ML IM: CPT | Performed by: STUDENT IN AN ORGANIZED HEALTH CARE EDUCATION/TRAINING PROGRAM

## 2023-11-30 PROCEDURE — 90677 PCV20 VACCINE IM: CPT | Performed by: STUDENT IN AN ORGANIZED HEALTH CARE EDUCATION/TRAINING PROGRAM

## 2023-11-30 PROCEDURE — 3074F SYST BP LT 130 MM HG: CPT | Performed by: STUDENT IN AN ORGANIZED HEALTH CARE EDUCATION/TRAINING PROGRAM

## 2023-11-30 PROCEDURE — 3079F DIAST BP 80-89 MM HG: CPT | Performed by: STUDENT IN AN ORGANIZED HEALTH CARE EDUCATION/TRAINING PROGRAM

## 2023-11-30 PROCEDURE — 90471 IMMUNIZATION ADMIN: CPT | Performed by: STUDENT IN AN ORGANIZED HEALTH CARE EDUCATION/TRAINING PROGRAM

## 2023-11-30 PROCEDURE — 99214 OFFICE O/P EST MOD 30 MIN: CPT | Mod: 25 | Performed by: STUDENT IN AN ORGANIZED HEALTH CARE EDUCATION/TRAINING PROGRAM

## 2023-11-30 RX ORDER — EZETIMIBE 10 MG/1
10 TABLET ORAL DAILY
Qty: 100 TABLET | Refills: 0 | Status: SHIPPED | OUTPATIENT
Start: 2023-11-30 | End: 2024-03-01 | Stop reason: SDUPTHER

## 2023-11-30 RX ORDER — FENOFIBRATE 145 MG/1
145 TABLET, COATED ORAL DAILY
Qty: 100 TABLET | Refills: 0 | Status: SHIPPED | OUTPATIENT
Start: 2023-11-30 | End: 2024-03-01 | Stop reason: SDUPTHER

## 2023-11-30 NOTE — PROGRESS NOTES
Subjective:     CC: DM and dyslipidemia follow up    HPI:   Willa presents today with    Problem   Well Adult Exam    HCM:  Breast cancer screening: mammogram January 2023 BIRADs 2; repeat in January 2024  Cervical cancer screening: s/p hysterectomy 2009  Colorectal cancer screening: colonoscopy 2018 normal, next due 2028  No record or screening for HIV or HCV  Vaccines: Due for influenza, PCV-20, COVID-19 booster, and zoster.      Need for Vaccination   Dyslipidemia    Patient previously statins but could not tolerate due to severe myalgias.  Taking gemfibrozil and red rice yeast supplement  Last lipid panel April 2022, ; LDL 11/29/23 is 114. Improved but still above goal.   TG levels normal, history of moderate hypertriglyceridemia prior to initiation of fibrates.        Type 2 Diabetes Mellitus Without Complication, With Long-Term Current Use of Insulin ([facility])    11/30/23:  Continuing regular physical activity most days of the week. Has stationary bike, hikes and walks a lot.   Hemoglobin A1C 7.1% today, last check 7.4% in January 2023 and >9% prior to that.   Medications:   metformin ER 2,000 mg daily  dulaglutide 3 mg SC weekly  Empagliflozin 25 mg daily  Glimepiride 2 mg BID (Trial d/c with insulin initiation with poor BG control; patient without signs or symptoms of hypoglycemia and uses continuous blood glucose monitor with medication or dose changes)  Insulin glargine 30 units QHS  Taking ARB, no microalbumin on recent urine study  Retinal exam without diabetic complications 11/16/23, repeat in 1 year  CMP unremarkable other than elevated glucose    (see hyperlipidemia for details)         The patient's PMH, current medications, allergies, FH, surgical history, and social history were reviewed and updated as needed in the EMR.    ROS:  Review of Systems   Constitutional:  Negative for chills and fever.   HENT:  Negative for congestion and sore throat.    Eyes:  Negative for discharge and  "redness.   Respiratory:  Negative for cough and shortness of breath.    Cardiovascular:  Negative for chest pain and leg swelling.   Gastrointestinal:  Negative for abdominal pain, nausea and vomiting.   Musculoskeletal:  Negative for falls.   Skin:  Negative for rash.   Neurological:  Negative for tingling, sensory change, speech change, focal weakness and headaches.   Psychiatric/Behavioral:          Some stress and appropriate sadness with recent changes in family living situation, but overall doing well.        Objective:     Exam:  /82 (BP Location: Right arm, Patient Position: Sitting, BP Cuff Size: Adult)   Pulse 90   Temp 36.4 °C (97.5 °F) (Temporal)   Ht 1.651 m (5' 5\")   Wt 95.4 kg (210 lb 6.4 oz)   LMP 01/09/2002   SpO2 96%   BMI 35.01 kg/m²  Body mass index is 35.01 kg/m².    Physical Exam  Constitutional:       Appearance: Normal appearance.   HENT:      Head: Normocephalic and atraumatic.      Right Ear: External ear normal.      Left Ear: External ear normal.      Mouth/Throat:      Mouth: Mucous membranes are moist.      Pharynx: Oropharynx is clear. No oropharyngeal exudate or posterior oropharyngeal erythema.   Eyes:      Conjunctiva/sclera: Conjunctivae normal.   Cardiovascular:      Rate and Rhythm: Normal rate and regular rhythm.      Heart sounds: Murmur heard.   Pulmonary:      Effort: Pulmonary effort is normal. No respiratory distress.      Breath sounds: Normal breath sounds. No wheezing, rhonchi or rales.   Musculoskeletal:         General: No swelling or deformity.      Cervical back: Neck supple. No tenderness.   Lymphadenopathy:      Cervical: No cervical adenopathy.   Skin:     General: Skin is warm and dry.      Capillary Refill: Capillary refill takes less than 2 seconds.      Findings: No lesion.   Neurological:      General: No focal deficit present.      Mental Status: She is alert.   Psychiatric:         Mood and Affect: Mood normal.         Behavior: Behavior " normal.         Thought Content: Thought content normal.         Judgment: Judgment normal.           Assessment & Plan:     53 y.o. female with the following -     Problem List Items Addressed This Visit       Type 2 diabetes mellitus without complication, with long-term current use of insulin (HCC)     Continue current lifestyle behaviors and current pharmacotherapy.  If hemoglobin A1C persists at or below goal, consider decreasing medication burden and reassessing with CGM at future visit.  Monofilament exam at next visit           Dyslipidemia     Transition from gemfibrozil to fenofibrate due to decreased risk of effects from combination with red rice yeast and new medicine, ezetimibe  Start ezetimibe  Repeat lipid panel in 4-8 weeks  If LDL not closer to target, consider PSCK9-inhibitor  Provided counseling on heart healthy diet and maintaining regular physical activity.          Relevant Orders    Lipid Profile    Well adult exam     Mammo in January, order placed  Ordered HIV and HCV screening tests  Influenza vaccine and PCV-20 administered in clinic today. Patient provided information about obtaining COVID-19 vaccine and zoster vaccine at pharmacy.            Relevant Orders    HIV AG/AB COMBO ASSAY SCREENING    HEP C VIRUS ANTIBODY    MA-SCREENING MAMMO BILAT W/TOMOSYNTHESIS W/CAD    Need for vaccination    Relevant Orders    Pneumococcal Conjugate Vaccine 20-Valent (6 wks+) (Completed)    INFLUENZA VACCINE QUAD INJ (PF) (Completed)             No follow-ups on file.

## 2023-12-01 PROBLEM — Z00.00 WELL ADULT EXAM: Status: ACTIVE | Noted: 2023-12-01

## 2023-12-01 PROBLEM — Z23 NEED FOR VACCINATION: Status: ACTIVE | Noted: 2023-12-01

## 2023-12-01 ASSESSMENT — ENCOUNTER SYMPTOMS
FEVER: 0
COUGH: 0
ABDOMINAL PAIN: 0
EYE REDNESS: 0
VOMITING: 0
FOCAL WEAKNESS: 0
SHORTNESS OF BREATH: 0
SPEECH CHANGE: 0
SORE THROAT: 0
SENSORY CHANGE: 0
NAUSEA: 0
CHILLS: 0
EYE DISCHARGE: 0
TINGLING: 0
HEADACHES: 0
FALLS: 0

## 2023-12-01 NOTE — ASSESSMENT & PLAN NOTE
Transition from gemfibrozil to fenofibrate due to decreased risk of effects from combination with red rice yeast and new medicine, ezetimibe  Start ezetimibe  Repeat lipid panel in 4-8 weeks  If LDL not closer to target, consider PSCK9-inhibitor  Provided counseling on heart healthy diet and maintaining regular physical activity.

## 2023-12-01 NOTE — ASSESSMENT & PLAN NOTE
Continue current lifestyle behaviors and current pharmacotherapy.  If hemoglobin A1C persists at or below goal, consider decreasing medication burden and reassessing with CGM at future visit.  Monofilament exam at next visit

## 2023-12-01 NOTE — ASSESSMENT & PLAN NOTE
Dariel[facility] in January, order placed  Ordered HIV and HCV screening tests  Influenza vaccine and PCV-20 administered in clinic today. Patient provided information about obtaining COVID-19 vaccine and zoster vaccine at pharmacy.

## 2023-12-10 DIAGNOSIS — I10 ESSENTIAL HYPERTENSION, BENIGN: ICD-10-CM

## 2023-12-11 RX ORDER — AMLODIPINE BESYLATE 5 MG/1
5 TABLET ORAL DAILY
Qty: 90 TABLET | Refills: 3 | Status: SHIPPED | OUTPATIENT
Start: 2023-12-11

## 2024-01-02 RX ORDER — EMPAGLIFLOZIN 25 MG/1
1 TABLET, FILM COATED ORAL DAILY
Qty: 90 TABLET | Refills: 3 | Status: SHIPPED | OUTPATIENT
Start: 2024-01-02

## 2024-01-16 DIAGNOSIS — E11.9 TYPE 2 DIABETES MELLITUS WITHOUT COMPLICATION, WITHOUT LONG-TERM CURRENT USE OF INSULIN (HCC): ICD-10-CM

## 2024-01-17 RX ORDER — DULAGLUTIDE 3 MG/.5ML
0.5 INJECTION, SOLUTION SUBCUTANEOUS
Qty: 6 ML | Refills: 3 | Status: SHIPPED
Start: 2024-01-17 | End: 2024-03-15

## 2024-01-17 NOTE — TELEPHONE ENCOUNTER
Received request via: Patient    Was the patient seen in the last year in this department? Yes    Does the patient have an active prescription (recently filled or refills available) for medication(s) requested? No    Does the patient have retirement Plus and need 100 day supply (blood pressure, diabetes and cholesterol meds only)? Patient does not have SCP    Patient comment: 3 months x 4 please and thank you!

## 2024-01-30 ENCOUNTER — HOSPITAL ENCOUNTER (OUTPATIENT)
Dept: RADIOLOGY | Facility: MEDICAL CENTER | Age: 54
End: 2024-01-30
Attending: STUDENT IN AN ORGANIZED HEALTH CARE EDUCATION/TRAINING PROGRAM
Payer: COMMERCIAL

## 2024-01-30 DIAGNOSIS — Z00.00 WELL ADULT EXAM: ICD-10-CM

## 2024-01-30 PROCEDURE — 77067 SCR MAMMO BI INCL CAD: CPT

## 2024-03-01 RX ORDER — EZETIMIBE 10 MG/1
10 TABLET ORAL DAILY
Qty: 100 TABLET | Refills: 0 | Status: SHIPPED | OUTPATIENT
Start: 2024-03-01

## 2024-03-01 RX ORDER — FENOFIBRATE 145 MG/1
145 TABLET, COATED ORAL DAILY
Qty: 100 TABLET | Refills: 0 | Status: SHIPPED | OUTPATIENT
Start: 2024-03-01

## 2024-03-01 NOTE — TELEPHONE ENCOUNTER
Received request via: Pharmacy    Was the patient seen in the last year in this department? Yes    Does the patient have an active prescription (recently filled or refills available) for medication(s) requested? No    Pharmacy Name: [id number] - [place], [place] - [address]     Does the patient have custodial Plus and need 100 day supply (blood pressure, diabetes and cholesterol meds only)? Patient does not have SCP

## 2024-03-11 ENCOUNTER — HOSPITAL ENCOUNTER (OUTPATIENT)
Dept: LAB | Facility: MEDICAL CENTER | Age: 54
End: 2024-03-11
Attending: STUDENT IN AN ORGANIZED HEALTH CARE EDUCATION/TRAINING PROGRAM
Payer: COMMERCIAL

## 2024-03-11 DIAGNOSIS — Z00.00 WELL ADULT EXAM: ICD-10-CM

## 2024-03-11 DIAGNOSIS — E78.5 DYSLIPIDEMIA: ICD-10-CM

## 2024-03-11 LAB
CHOLEST SERPL-MCNC: 157 MG/DL (ref 100–199)
FASTING STATUS PATIENT QL REPORTED: NORMAL
HCV AB SER QL: REACTIVE
HDLC SERPL-MCNC: 45 MG/DL
HIV 1+2 AB+HIV1 P24 AG SERPL QL IA: NORMAL
LDLC SERPL CALC-MCNC: 91 MG/DL
TRIGL SERPL-MCNC: 106 MG/DL (ref 0–149)

## 2024-03-11 PROCEDURE — 87522 HEPATITIS C REVRS TRNSCRPJ: CPT

## 2024-03-11 PROCEDURE — 87389 HIV-1 AG W/HIV-1&-2 AB AG IA: CPT

## 2024-03-11 PROCEDURE — 80061 LIPID PANEL: CPT

## 2024-03-11 PROCEDURE — 86803 HEPATITIS C AB TEST: CPT

## 2024-03-11 PROCEDURE — 36415 COLL VENOUS BLD VENIPUNCTURE: CPT

## 2024-03-13 LAB
HCV RNA SERPL NAA+PROBE-ACNC: NOT DETECTED IU/ML
HCV RNA SERPL NAA+PROBE-LOG IU: NOT DETECTED LOG IU/ML
HCV RNA SERPL QL NAA+PROBE: NOT DETECTED

## 2024-03-15 ENCOUNTER — OFFICE VISIT (OUTPATIENT)
Dept: MEDICAL GROUP | Facility: CLINIC | Age: 54
End: 2024-03-15
Payer: COMMERCIAL

## 2024-03-15 VITALS
WEIGHT: 212 LBS | OXYGEN SATURATION: 93 % | BODY MASS INDEX: 34.07 KG/M2 | SYSTOLIC BLOOD PRESSURE: 128 MMHG | HEIGHT: 66 IN | DIASTOLIC BLOOD PRESSURE: 86 MMHG | HEART RATE: 75 BPM | TEMPERATURE: 97.4 F

## 2024-03-15 DIAGNOSIS — E11.9 TYPE 2 DIABETES MELLITUS WITHOUT COMPLICATION, WITHOUT LONG-TERM CURRENT USE OF INSULIN (HCC): ICD-10-CM

## 2024-03-15 DIAGNOSIS — E78.5 DYSLIPIDEMIA: ICD-10-CM

## 2024-03-15 DIAGNOSIS — Z79.4 TYPE 2 DIABETES MELLITUS WITHOUT COMPLICATION, WITH LONG-TERM CURRENT USE OF INSULIN (HCC): ICD-10-CM

## 2024-03-15 DIAGNOSIS — E11.9 TYPE 2 DIABETES MELLITUS WITHOUT COMPLICATION, WITH LONG-TERM CURRENT USE OF INSULIN (HCC): ICD-10-CM

## 2024-03-15 PROCEDURE — 3079F DIAST BP 80-89 MM HG: CPT | Performed by: STUDENT IN AN ORGANIZED HEALTH CARE EDUCATION/TRAINING PROGRAM

## 2024-03-15 PROCEDURE — 99214 OFFICE O/P EST MOD 30 MIN: CPT | Performed by: STUDENT IN AN ORGANIZED HEALTH CARE EDUCATION/TRAINING PROGRAM

## 2024-03-15 PROCEDURE — 3074F SYST BP LT 130 MM HG: CPT | Performed by: STUDENT IN AN ORGANIZED HEALTH CARE EDUCATION/TRAINING PROGRAM

## 2024-03-15 SDOH — HEALTH STABILITY: PHYSICAL HEALTH: EXERCISE LEVEL: MODERATE

## 2024-03-15 SDOH — HEALTH STABILITY: PHYSICAL HEALTH: FIVE TIMES PER WEEK: 1

## 2024-03-15 ASSESSMENT — ENCOUNTER SYMPTOMS
CHILLS: 0
TINGLING: 1
BLURRED VISION: 0
ABDOMINAL PAIN: 0
FEVER: 0
FOCAL WEAKNESS: 0
SPEECH CHANGE: 0
DIARRHEA: 0
VOMITING: 0
SORE THROAT: 0
WHEEZING: 0
WEAKNESS: 0
PALPITATIONS: 0
SHORTNESS OF BREATH: 0
CONSTIPATION: 0
SENSORY CHANGE: 0
DEPRESSION: 0

## 2024-03-15 ASSESSMENT — PATIENT HEALTH QUESTIONNAIRE - PHQ9: CLINICAL INTERPRETATION OF PHQ2 SCORE: 0

## 2024-03-15 NOTE — PROGRESS NOTES
"Subjective:     CC: f/u    HPI:   Willa presents today with    Problem   Dyslipidemia    Patient previously statins but could not tolerate due to severe myalgias.  Transitioned from gemfibrozil to fenofibrate and added ezetimibe at last visit; repeat lipid panel with improvement in LDL from 114 to 91  - Continue fenofibrate and ezetimibe           Type 2 Diabetes Mellitus Without Complication, With Long-Term Current Use of Insulin ([facility])    3/14/24:    Hemoglobin A1C: 7.7% today, up from 7.1% at last visit; patient attributes some laxity with food choices (\"celebration\") with recent changes at home.  Medications:   metformin ER 2,000 mg daily  dulaglutide 3 mg SC weekly  Empagliflozin 25 mg daily  Glimepiride 2 mg BID (Trial d/c with insulin initiation with poor BG control; patient without signs or symptoms of hypoglycemia and uses continuous blood glucose monitor with medication or dose changes)  Insulin glargine 30 units QHS  Taking ARB, no microalbumin on recent urine study  Retinal exam without diabetic complications 11/16/23, repeat in 1 year  CMP unremarkable other than elevated glucose   Monofilament exam normal today  LDL 91         Health Maintenance:   Breast cancer screening: mammogram 1/30/2024 BIRAD 1  Cervical cancer screening: S/p hysterectomy 2009  CRC screening: colonoscopy 2018 nl, next due 2028  HIV screening: 3/11/24, NR  HCV screening: 3/11/24; Reactive Ab but all confirmatory/quantitative testing undetectable  PCV-20 and flu vaccine 11/30/23    ROS:  Review of Systems   Constitutional:  Negative for chills and fever.   HENT:  Negative for congestion and sore throat.    Eyes:  Negative for blurred vision.   Respiratory:  Negative for shortness of breath and wheezing.    Cardiovascular:  Negative for chest pain, palpitations and leg swelling.   Gastrointestinal:  Negative for abdominal pain, constipation, diarrhea and vomiting.   Skin:  Negative for itching and rash.   Neurological:  Positive " "for tingling. Negative for sensory change, speech change, focal weakness and weakness.   Psychiatric/Behavioral:  Negative for depression.        Objective:     Exam:  /86 (BP Location: Left arm, Patient Position: Sitting, BP Cuff Size: Adult)   Pulse 75   Temp 36.3 °C (97.4 °F) (Temporal)   Ht 1.676 m (5' 6\")   Wt 96.2 kg (212 lb)   LMP 01/09/2002   SpO2 93%   BMI 34.22 kg/m²  Body mass index is 34.22 kg/m².    Physical Exam  Constitutional:       General: She is not in acute distress.     Appearance: Normal appearance. She is not ill-appearing.   HENT:      Head: Normocephalic and atraumatic.      Right Ear: External ear normal.      Left Ear: External ear normal.      Nose: Nose normal.   Eyes:      Conjunctiva/sclera: Conjunctivae normal.   Cardiovascular:      Rate and Rhythm: Normal rate and regular rhythm.      Pulses: Normal pulses.           Dorsalis pedis pulses are 2+ on the right side and 2+ on the left side.        Posterior tibial pulses are 2+ on the right side and 2+ on the left side.      Heart sounds: Normal heart sounds.   Pulmonary:      Effort: Pulmonary effort is normal. No respiratory distress.      Breath sounds: Normal breath sounds. No wheezing, rhonchi or rales.   Musculoskeletal:         General: No swelling or deformity.      Right foot: Normal range of motion. No deformity.      Left foot: Normal range of motion. No deformity.   Feet:      Right foot:      Protective Sensation: 4 sites tested.  4 sites sensed.      Skin integrity: Callus present. No ulcer, blister, skin breakdown, erythema, warmth or fissure.      Toenail Condition: Right toenails are normal.      Left foot:      Protective Sensation: 4 sites tested.  4 sites sensed.      Skin integrity: Callus present. No ulcer, blister, skin breakdown, erythema, warmth or fissure.      Toenail Condition: Left toenails are normal.   Skin:     General: Skin is warm and dry.      Capillary Refill: Capillary refill takes less " than 2 seconds.   Neurological:      General: No focal deficit present.      Mental Status: She is alert.      Sensory: No sensory deficit.      Gait: Gait normal.   Psychiatric:         Mood and Affect: Mood normal.         Behavior: Behavior normal.         Thought Content: Thought content normal.         Judgment: Judgment normal.           Assessment & Plan:     53 y.o. female with the following -     Problem List Items Addressed This Visit       Type 2 diabetes mellitus without complication, with long-term current use of insulin ([facility])     A1C increased from prior  - Increase dulaglutide to 4.5 mg weekly  - Trial of tapering off sulfonylureas again  - Restart use of CGM and plan to review data at next visit  - May try getting terzepatide approved if no significant improvement with max dose of dulaglutide  - continue metformin, empagliflozin, and insulin glargine         Relevant Medications    Dulaglutide 4.5 MG/0.5ML Solution Pen-injector    Dyslipidemia     Improved lipid panel with LDL <100  Continue fenofibrate and ezetimibe          Other Visit Diagnoses       Type 2 diabetes mellitus without complication, without long-term current use of insulin ([facility])        Relevant Medications    Dulaglutide 4.5 MG/0.5ML Solution Pen-injector              RTC in 2-3 months

## 2024-03-16 NOTE — ASSESSMENT & PLAN NOTE
A1C increased from prior  - Increase dulaglutide to 4.5 mg weekly  - Trial of tapering off sulfonylureas again  - Restart use of CGM and plan to review data at next visit  - May try getting terzepatide approved if no significant improvement with max dose of dulaglutide  - continue metformin, empagliflozin, and insulin glargine

## 2024-04-04 DIAGNOSIS — E11.9 TYPE 2 DIABETES MELLITUS WITHOUT COMPLICATION, WITH LONG-TERM CURRENT USE OF INSULIN (HCC): ICD-10-CM

## 2024-04-04 DIAGNOSIS — Z79.4 TYPE 2 DIABETES MELLITUS WITHOUT COMPLICATION, WITH LONG-TERM CURRENT USE OF INSULIN (HCC): ICD-10-CM

## 2024-04-04 RX ORDER — INSULIN GLARGINE-YFGN 100 [IU]/ML
30 INJECTION, SOLUTION SUBCUTANEOUS
Qty: 30 ML | Refills: 3 | Status: CANCELLED | OUTPATIENT
Start: 2024-04-04

## 2024-04-05 RX ORDER — INSULIN GLARGINE-YFGN 100 [IU]/ML
30 INJECTION, SOLUTION SUBCUTANEOUS
Qty: 30 ML | Refills: 3 | Status: SHIPPED
Start: 2024-04-05 | End: 2024-04-09

## 2024-04-05 NOTE — TELEPHONE ENCOUNTER
Received request via: Pharmacy    Was the patient seen in the last year in this department? Yes    Does the patient have an active prescription (recently filled or refills available) for medication(s) requested? No    Pharmacy Name:   [id number] - [place] - [address]

## 2024-04-09 DIAGNOSIS — Z79.4 TYPE 2 DIABETES MELLITUS WITHOUT COMPLICATION, WITH LONG-TERM CURRENT USE OF INSULIN (HCC): ICD-10-CM

## 2024-04-09 DIAGNOSIS — E11.9 TYPE 2 DIABETES MELLITUS WITHOUT COMPLICATION, WITH LONG-TERM CURRENT USE OF INSULIN (HCC): ICD-10-CM

## 2024-04-09 RX ORDER — INSULIN GLARGINE-YFGN 100 [IU]/ML
30 INJECTION, SOLUTION SUBCUTANEOUS
Qty: 15 ML | Refills: 3 | Status: SHIPPED | OUTPATIENT
Start: 2024-04-09

## 2024-04-26 RX ORDER — PEN NEEDLE, DIABETIC 30 GX3/16"
1 NEEDLE, DISPOSABLE MISCELLANEOUS
Qty: 30 EACH | Refills: 1 | Status: SHIPPED | OUTPATIENT
Start: 2024-04-26

## 2024-04-26 NOTE — PROGRESS NOTES
Patient unable to access higher dose of dulaglutide due to supply issues. Tried multiple pharmacies.    Patient interested in trying different GLP-1 agonist. Discussed options and will attempt transition to Ozempic (semaglutide SC) starting at 1 mg SC once weekly given that she is not naive to this class of medications. Continue to monitor BG (has CGM) and we can titrate up as needed.

## 2024-05-14 DIAGNOSIS — I10 ESSENTIAL HYPERTENSION: ICD-10-CM

## 2024-05-15 RX ORDER — HYDROCHLOROTHIAZIDE 25 MG/1
25 TABLET ORAL DAILY
Qty: 90 TABLET | Refills: 3 | Status: SHIPPED | OUTPATIENT
Start: 2024-05-15

## 2024-05-15 NOTE — TELEPHONE ENCOUNTER
Received request via: Pharmacy    Was the patient seen in the last year in this department? Yes    Does the patient have an active prescription (recently filled or refills available) for medication(s) requested? No    Pharmacy Name: [facility]

## 2024-05-27 DIAGNOSIS — I10 ESSENTIAL HYPERTENSION, BENIGN: ICD-10-CM

## 2024-05-28 RX ORDER — EZETIMIBE 10 MG/1
10 TABLET ORAL DAILY
Qty: 100 TABLET | Refills: 3 | Status: SHIPPED | OUTPATIENT
Start: 2024-05-28

## 2024-05-28 RX ORDER — LOSARTAN POTASSIUM 100 MG/1
100 TABLET ORAL DAILY
Qty: 100 TABLET | Refills: 3 | Status: SHIPPED | OUTPATIENT
Start: 2024-05-28

## 2024-05-28 RX ORDER — FENOFIBRATE 145 MG/1
145 TABLET, COATED ORAL DAILY
Qty: 100 TABLET | Refills: 3 | Status: SHIPPED | OUTPATIENT
Start: 2024-05-28

## 2024-05-28 RX ORDER — FLASH GLUCOSE SENSOR
1 KIT MISCELLANEOUS
Qty: 6 EACH | Refills: 3 | Status: SHIPPED | OUTPATIENT
Start: 2024-05-28 | End: 2025-05-23

## 2024-05-28 NOTE — TELEPHONE ENCOUNTER
Received request via: Pharmacy    Was the patient seen in the last year in this department? Yes    Does the patient have an active prescription (recently filled or refills available) for medication(s) requested? No    Pharmacy Name: [facility]    Does the patient have retirement Plus and need 100 day supply (blood pressure, diabetes and cholesterol meds only)? Patient does not have SCP

## 2024-07-09 DIAGNOSIS — E11.9 TYPE 2 DIABETES MELLITUS WITHOUT COMPLICATION, WITH LONG-TERM CURRENT USE OF INSULIN (HCC): ICD-10-CM

## 2024-07-09 DIAGNOSIS — Z79.4 TYPE 2 DIABETES MELLITUS WITHOUT COMPLICATION, WITH LONG-TERM CURRENT USE OF INSULIN (HCC): ICD-10-CM

## 2024-07-09 DIAGNOSIS — E11.65 TYPE 2 DIABETES MELLITUS WITH HYPERGLYCEMIA, WITHOUT LONG-TERM CURRENT USE OF INSULIN (HCC): ICD-10-CM

## 2024-07-16 RX ORDER — INSULIN GLARGINE-YFGN 100 [IU]/ML
30 INJECTION, SOLUTION SUBCUTANEOUS
Qty: 15 ML | Refills: 3 | Status: SHIPPED | OUTPATIENT
Start: 2024-07-16

## 2024-07-16 RX ORDER — BLOOD-GLUCOSE SENSOR
1 EACH MISCELLANEOUS CONTINUOUS
Qty: 6 EACH | Refills: 3 | Status: SHIPPED | OUTPATIENT
Start: 2024-07-16 | End: 2024-07-30

## 2024-07-16 RX ORDER — METFORMIN HYDROCHLORIDE 500 MG/1
2000 TABLET, EXTENDED RELEASE ORAL DAILY
Qty: 360 TABLET | Refills: 1 | Status: SHIPPED | OUTPATIENT
Start: 2024-07-16

## 2024-12-09 DIAGNOSIS — I10 ESSENTIAL HYPERTENSION, BENIGN: ICD-10-CM

## 2024-12-10 RX ORDER — AMLODIPINE BESYLATE 5 MG/1
5 TABLET ORAL DAILY
Qty: 90 TABLET | Refills: 3 | Status: SHIPPED | OUTPATIENT
Start: 2024-12-10

## 2024-12-10 NOTE — TELEPHONE ENCOUNTER
Received request via: Pharmacy    Was the patient seen in the last year in this department? Yes    Does the patient have an active prescription (recently filled or refills available) for medication(s) requested? No    Pharmacy Name: [id number] - [place], [place] - [address]     Does the patient have longterm Plus and need 100-day supply? (This applies to ALL medications) Patient does not have SCP

## 2024-12-12 RX ORDER — SEMAGLUTIDE 1.34 MG/ML
INJECTION, SOLUTION SUBCUTANEOUS
Qty: 9 ML | Refills: 3 | Status: SHIPPED | OUTPATIENT
Start: 2024-12-12

## 2024-12-12 NOTE — TELEPHONE ENCOUNTER
Received request via: Pharmacy    Was the patient seen in the last year in this department? Yes    Does the patient have an active prescription (recently filled or refills available) for medication(s) requested? No    Pharmacy Name:   [facility] - [address]

## 2024-12-24 DIAGNOSIS — E11.65 TYPE 2 DIABETES MELLITUS WITH HYPERGLYCEMIA, WITHOUT LONG-TERM CURRENT USE OF INSULIN (HCC): ICD-10-CM

## 2024-12-26 RX ORDER — METFORMIN HYDROCHLORIDE 500 MG/1
TABLET, EXTENDED RELEASE ORAL
Qty: 360 TABLET | Refills: 3 | Status: SHIPPED | OUTPATIENT
Start: 2024-12-26

## 2024-12-26 NOTE — TELEPHONE ENCOUNTER
Received request via: Pharmacy    Was the patient seen in the last year in this department? Yes    Does the patient have an active prescription (recently filled or refills available) for medication(s) requested? No    Pharmacy Name:   [facility] - [address]       Does the patient have long-term Plus and need 100-day supply? (This applies to ALL medications) Patient does not have SCP

## 2024-12-31 RX ORDER — EMPAGLIFLOZIN 25 MG/1
1 TABLET, FILM COATED ORAL DAILY
Qty: 90 TABLET | Refills: 3 | Status: SHIPPED | OUTPATIENT
Start: 2024-12-31

## 2024-12-31 NOTE — TELEPHONE ENCOUNTER
Received request via: Pharmacy    Was the patient seen in the last year in this department? Yes    Does the patient have an active prescription (recently filled or refills available) for medication(s) requested? No    Pharmacy Name: [facility]    Does the patient have care home Plus and need 100-day supply? (This applies to ALL medications) Patient does not have SCP

## 2025-02-07 DIAGNOSIS — Z79.4 TYPE 2 DIABETES MELLITUS WITHOUT COMPLICATION, WITH LONG-TERM CURRENT USE OF INSULIN (HCC): ICD-10-CM

## 2025-02-07 DIAGNOSIS — E11.9 TYPE 2 DIABETES MELLITUS WITHOUT COMPLICATION, WITH LONG-TERM CURRENT USE OF INSULIN (HCC): ICD-10-CM

## 2025-02-10 RX ORDER — INSULIN GLARGINE-YFGN 100 [IU]/ML
INJECTION, SOLUTION SUBCUTANEOUS
Qty: 30 ML | Refills: 3 | Status: SHIPPED | OUTPATIENT
Start: 2025-02-10

## 2025-02-10 NOTE — TELEPHONE ENCOUNTER
Received request via: Pharmacy    Was the patient seen in the last year in this department? Yes    Does the patient have an active prescription (recently filled or refills available) for medication(s) requested? No    Pharmacy Name:   [facility] - [address]       Does the patient have intermediate Plus and need 100-day supply? (This applies to ALL medications) Patient does not have SCP

## 2025-03-21 ENCOUNTER — OFFICE VISIT (OUTPATIENT)
Dept: URGENT CARE | Facility: CLINIC | Age: 55
End: 2025-03-21
Payer: COMMERCIAL

## 2025-03-21 VITALS
HEART RATE: 85 BPM | OXYGEN SATURATION: 95 % | BODY MASS INDEX: 33.53 KG/M2 | WEIGHT: 196.4 LBS | RESPIRATION RATE: 16 BRPM | HEIGHT: 64 IN | TEMPERATURE: 97.7 F

## 2025-03-21 DIAGNOSIS — R11.2 NAUSEA AND VOMITING, UNSPECIFIED VOMITING TYPE: ICD-10-CM

## 2025-03-21 DIAGNOSIS — R19.7 DIARRHEA, UNSPECIFIED TYPE: ICD-10-CM

## 2025-03-21 PROCEDURE — 99213 OFFICE O/P EST LOW 20 MIN: CPT | Performed by: FAMILY MEDICINE

## 2025-03-21 RX ORDER — ONDANSETRON 4 MG/1
4 TABLET, ORALLY DISINTEGRATING ORAL EVERY 8 HOURS PRN
Qty: 10 TABLET | Refills: 0 | Status: SHIPPED | OUTPATIENT
Start: 2025-03-21

## 2025-03-21 ASSESSMENT — ENCOUNTER SYMPTOMS
EYE DISCHARGE: 0
WEIGHT LOSS: 0
MYALGIAS: 0
EYE REDNESS: 0

## 2025-03-21 NOTE — PROGRESS NOTES
"Subjective     Willa Haywood is a 54 y.o. female who presents with Other (Would like to rule out hantavirus due to exposure to mice, nausea, vomiting,HA, extreme fatigue, cold sweats,massive stomach cramps sx started on Wednesday and have gotten worse )            3 days multiple episodes NVD without blood. Trying to push fluids with normal urine output.  Waxing and waning generalized abdominal cramping.  Subjective fever and chills.  Myalgia.  Headache.  Last week she was exposed to roommate with similar symptoms.  No recent foreign travel/camping/antibiotic use.    She notes recent exposure to rodent feces that she cleaned using bleach and wearing a regular paper mask.  She is concerned about possible hantavirus.  There is no cough or shortness of breath.  No other aggravating or alleviating factor.        Review of Systems   Constitutional:  Positive for malaise/fatigue. Negative for weight loss.   Eyes:  Negative for discharge and redness.   Musculoskeletal:  Negative for joint pain and myalgias.   Skin:  Negative for itching and rash.              Objective     Pulse 85   Temp 36.5 °C (97.7 °F) (Temporal)   Resp 16   Ht 1.626 m (5' 4\")   Wt 89.1 kg (196 lb 6.4 oz)   LMP 01/09/2002   SpO2 95%   BMI 33.71 kg/m²      Physical Exam  Constitutional:       General: She is not in acute distress.     Appearance: She is well-developed.   HENT:      Head: Normocephalic and atraumatic.      Mouth/Throat:      Mouth: Mucous membranes are moist.   Eyes:      Conjunctiva/sclera: Conjunctivae normal.   Cardiovascular:      Rate and Rhythm: Normal rate and regular rhythm.      Heart sounds: Normal heart sounds. No murmur heard.  Pulmonary:      Effort: Pulmonary effort is normal.      Breath sounds: Normal breath sounds. No wheezing.   Abdominal:      Palpations: Abdomen is soft.      Tenderness: There is abdominal tenderness (mild generalized).      Comments: Hyperactive bowel sounds.     Skin:     " General: Skin is warm and dry.      Findings: No rash.   Neurological:      Mental Status: She is alert.               1. Nausea and vomiting, unspecified vomiting type  ondansetron (ZOFRAN ODT) 4 MG TABLET DISPERSIBLE      2. Diarrhea, unspecified type          Differential diagnosis, natural history, supportive care, and indications for immediate follow-up were discussed.     Given 3 days of primarily gastrointestinal symptoms as well as possible exposure to viral gastroenteritis suspect this is the cause of her symptoms.  She has absolutely no respiratory component to this illness currently.  Given her exposure to rodent feces I recommend that she go to the emergency department if she develops a cough or shortness of breath.  Otherwise push fluids with Pedialyte.  Consider over-the-counter Imodium, and use Zofran as needed.

## 2025-04-04 ENCOUNTER — APPOINTMENT (OUTPATIENT)
Dept: MEDICAL GROUP | Facility: CLINIC | Age: 55
End: 2025-04-04
Payer: COMMERCIAL

## 2025-05-09 DIAGNOSIS — I10 ESSENTIAL HYPERTENSION: ICD-10-CM

## 2025-05-09 RX ORDER — HYDROCHLOROTHIAZIDE 25 MG/1
25 TABLET ORAL DAILY
Qty: 90 TABLET | Refills: 3 | Status: SHIPPED | OUTPATIENT
Start: 2025-05-09

## 2025-05-09 NOTE — TELEPHONE ENCOUNTER
Received request via: Patient    Was the patient seen in the last year in this department? No    Does the patient have an active prescription (recently filled or refills available) for medication(s) requested? No    Pharmacy Name:   [facility] - [address] [address]  Phone: [phone] Fax: [phone]        Does the patient have detention Plus and need 100-day supply? (This applies to ALL medications) Patient does not have SCP

## 2025-05-15 DIAGNOSIS — I10 ESSENTIAL HYPERTENSION, BENIGN: ICD-10-CM

## 2025-05-16 RX ORDER — EZETIMIBE 10 MG/1
10 TABLET ORAL DAILY
Qty: 100 TABLET | Refills: 3 | Status: SHIPPED | OUTPATIENT
Start: 2025-05-16

## 2025-05-16 RX ORDER — LOSARTAN POTASSIUM 100 MG/1
100 TABLET ORAL DAILY
Qty: 100 TABLET | Refills: 3 | Status: SHIPPED | OUTPATIENT
Start: 2025-05-16

## 2025-05-16 NOTE — TELEPHONE ENCOUNTER
Received request via: Patient    Was the patient seen in the last year in this department? Yes    Does the patient have an active prescription (recently filled or refills available) for medication(s) requested? No    Pharmacy Name: [facility]    Does the patient have [facility] Plus and need 100-day supply? (This applies to ALL medications) Patient does not have SCP

## 2025-05-23 RX ORDER — FENOFIBRATE 145 MG/1
145 TABLET, FILM COATED ORAL DAILY
Qty: 100 TABLET | Refills: 0 | Status: SHIPPED | OUTPATIENT
Start: 2025-05-23

## 2025-05-23 NOTE — TELEPHONE ENCOUNTER
Received request via: Pharmacy    Was the patient seen in the last year in this department? No    Does the patient have an active prescription (recently filled or refills available) for medication(s) requested? No    Pharmacy Name: [facility] - [address]

## 2025-05-23 NOTE — TELEPHONE ENCOUNTER
Dr. Chakraborty patient who is out on leave. Will send in 100 day supply for patient to requested pharmacy. Patient should have follow-up appointment with PCP for additional refills as she has not been seen within the last 12 months.

## 2025-07-31 ENCOUNTER — OFFICE VISIT (OUTPATIENT)
Dept: MEDICAL GROUP | Facility: CLINIC | Age: 55
End: 2025-07-31
Payer: COMMERCIAL

## 2025-07-31 ENCOUNTER — HOSPITAL ENCOUNTER (OUTPATIENT)
Facility: MEDICAL CENTER | Age: 55
End: 2025-07-31
Payer: COMMERCIAL

## 2025-07-31 VITALS
WEIGHT: 198 LBS | DIASTOLIC BLOOD PRESSURE: 67 MMHG | HEART RATE: 75 BPM | OXYGEN SATURATION: 95 % | TEMPERATURE: 96.3 F | BODY MASS INDEX: 31.82 KG/M2 | SYSTOLIC BLOOD PRESSURE: 104 MMHG | HEIGHT: 66 IN

## 2025-07-31 DIAGNOSIS — Z12.31 ENCOUNTER FOR SCREENING MAMMOGRAM FOR MALIGNANT NEOPLASM OF BREAST: Primary | ICD-10-CM

## 2025-07-31 DIAGNOSIS — Z12.31 ENCOUNTER FOR SCREENING MAMMOGRAM FOR BREAST CANCER: ICD-10-CM

## 2025-07-31 DIAGNOSIS — Z79.4 TYPE 2 DIABETES MELLITUS WITHOUT COMPLICATION, WITH LONG-TERM CURRENT USE OF INSULIN (HCC): ICD-10-CM

## 2025-07-31 DIAGNOSIS — E78.5 DYSLIPIDEMIA: ICD-10-CM

## 2025-07-31 DIAGNOSIS — I10 PRIMARY HYPERTENSION: ICD-10-CM

## 2025-07-31 DIAGNOSIS — E11.9 TYPE 2 DIABETES MELLITUS WITHOUT COMPLICATION, WITH LONG-TERM CURRENT USE OF INSULIN (HCC): ICD-10-CM

## 2025-07-31 DIAGNOSIS — Z00.00 WELL ADULT EXAM: ICD-10-CM

## 2025-07-31 PROBLEM — Z23 NEED FOR VACCINATION: Status: RESOLVED | Noted: 2023-12-01 | Resolved: 2025-07-31

## 2025-07-31 LAB
CREAT UR-MCNC: 37.3 MG/DL
HBA1C MFR BLD: 7.8 % (ref ?–5.8)
MICROALBUMIN UR-MCNC: <1.2 MG/DL
MICROALBUMIN/CREAT UR: NORMAL MG/G (ref 0–30)
POCT INT CON NEG: NEGATIVE
POCT INT CON POS: POSITIVE

## 2025-07-31 PROCEDURE — 82570 ASSAY OF URINE CREATININE: CPT

## 2025-07-31 PROCEDURE — 82043 UR ALBUMIN QUANTITATIVE: CPT

## 2025-07-31 RX ORDER — SEMAGLUTIDE 1.34 MG/ML
2 INJECTION, SOLUTION SUBCUTANEOUS
Qty: 9 ML | Refills: 3 | Status: SHIPPED | OUTPATIENT
Start: 2025-07-31

## 2025-07-31 RX ORDER — INSULIN GLARGINE-YFGN 100 [IU]/ML
30 INJECTION, SOLUTION SUBCUTANEOUS NIGHTLY
Qty: 30 ML | Refills: 3 | Status: SHIPPED | OUTPATIENT
Start: 2025-07-31

## 2025-07-31 ASSESSMENT — PATIENT HEALTH QUESTIONNAIRE - PHQ9: CLINICAL INTERPRETATION OF PHQ2 SCORE: 0

## 2025-07-31 NOTE — ASSESSMENT & PLAN NOTE
Type 2 diabetes with suboptimal control with A1c of 7.8 today.  Will increase semaglutide to 2 mg weekly.  She is due for diabetes screenings.  -Increase semaglutide to 2 mg subcutaneous once weekly  -Continue rest of current medication regimen.  -Pending retinopathy exam, patient has scheduled  -CMP and urine protein creatinine ratio ordered

## 2025-07-31 NOTE — PROGRESS NOTES
HPI:  Patient is a 55 y.o. female here for   Problem   Dyslipidemia    Patient previously statins but could not tolerate due to severe myalgias.  Now on gemfibrozil to fenofibrate and ezetimibe.  Reports adherence and no side effects.  Does not need refills.       Type 2 Diabetes Mellitus Without Complication, With Long-Term Current Use of Insulin ([facility])    7/31/25:  Hemoglobin A1C: 7.8% today, up from 7.1% at last visit; patient attributes some laxity with food choices.  Medications:   metformin ER 2,000 mg daily  Empagliflozin 25 mg daily  Insulin glargine 30 units QHS  Semaglutide 1 mg subcutaneous once weekly    Reports adherence with medications and requests refill of her glargine.  Due for monofilament exam and diabetes screenings.     Hypertension    Reports good BP control.  Has log on her phone and on average and the 110s over 70 to 80s.  She is taking losartan 100 mg, HCTZ 25 mg, and amlodipine 5 mg daily.  Reports adherence with medications,, no side effects.     Well Adult Exam (Resolved)          Need for Vaccination (Resolved)   Encounter for Screening Mammogram for Breast Cancer (Resolved)   Encounter for General Adult Medical Examination Without Abnormal Findings (Resolved)                                                                                                                                  Patient Active Problem List    Diagnosis Date Noted    Genetic screening 05/17/2022    Chest wall contusion, left, subsequent encounter 01/07/2022    Contusion, buttock, subsequent encounter 01/07/2022    Fall due to slipping on ice or snow 12/29/2021    Rib pain on left side 12/29/2021    Pain in left buttock 12/29/2021    Herpes simplex infection of skin 07/13/2021    Laceration without foreign body, right foot, initial encounter 08/01/2019    Sciatica 08/02/2018    Albuminuria manifested in a patient with type 2 DM 12/15/2013    Vitamin D deficiency 05/01/2012    Dyslipidemia 02/09/2012    S/P  "partial hysterectomy 06/26/2009    Type 2 diabetes mellitus without complication, with long-term current use of insulin (HCC) 06/26/2009    Obesity 06/26/2009    Hypertension 06/26/2009    Polycystic ovarian syndrome 06/26/2009       Current Medications[1]      Allergies as of 07/31/2025 - Reviewed 07/31/2025   Allergen Reaction Noted    Hmg-coa-r inhibitors  11/26/2018    Other drug  11/26/2018    Enalapril  02/09/2012    Lovastatin  02/09/2012    Lisinopril  11/26/2018          /67   Pulse 75   Temp (!) 35.7 °C (96.3 °F)   Ht 1.676 m (5' 6\")   Wt 89.8 kg (198 lb)   LMP 01/09/2002   SpO2 95%   BMI 31.96 kg/m²     Gen: no fevers/chills, no changes in weight  Eyes: no changes in vision  ENT: no sore throat, no hearing loss, no bloody nose  Pulm: no sob, no cough  CV: no chest pain, no palpitations  GI: no nausea/vomiting, no diarrhea  : no dysuria or flank pain  MSk: no myalgias  Skin: no rash  Psych: no change in mood   Neuro: no headaches, no numbness/tingling  Heme/Lymph: no easy bruising or bleeding    Physical Exam:  Gen:         Alert and oriented, No apparent distress.  Neck:        No Lymphadenopathy or Bruits.  Lungs:     Clear to auscultation bilaterally  CV:           Regular rate and rhythm. No murmurs, rubs or gallops.    Abdomen: soft, nontender, nondistended.    Skin:      no rash or exudate             Ext:          No clubbing, cyanosis, edema.    Monofilament testing with a 10 gram force: sensation intact: intact bilaterally  Visual Inspection: Feet without maceration, ulcers, fissures.  Pedal pulses: intact bilaterally.       Assessment and Plan    55 y.o. female with the following-  Problem List Items Addressed This Visit       Type 2 diabetes mellitus without complication, with long-term current use of insulin (HCC)    Type 2 diabetes with suboptimal control with A1c of 7.8 today.  Will increase semaglutide to 2 mg weekly.  She is due for diabetes screenings.  -Increase semaglutide " to 2 mg subcutaneous once weekly  -Continue rest of current medication regimen.  -Pending retinopathy exam, patient has scheduled  -CMP and urine protein creatinine ratio ordered         Relevant Medications    Insulin Glargine-yfgn (SEMGLEE, YFGN,) 100 UNIT/ML Solution Pen-injector    Semaglutide, 1 MG/DOSE, (OZEMPIC, 1 MG/DOSE,) 4 MG/3ML Solution Pen-injector    Other Relevant Orders    Microalbumin Creat Ratio Urine (Clinic Collect)    Diabetic Monofilament LE Exam (Completed)    POCT A1C (Completed)    Lipid Profile    Comp Metabolic Panel    CBC WITH DIFFERENTIAL    Hypertension    Continue current medication regimen.         Relevant Orders    CBC WITH DIFFERENTIAL    Dyslipidemia    Patient with previously well-controlled dyslipidemia.  Cannot tolerate statin.  Total cholesterol of 157 and LDL of 91 in March 2024.  Will recheck lipid panel.  - Continue current medication regimen         Relevant Orders    CBC WITH DIFFERENTIAL    RESOLVED: Encounter for screening mammogram for breast cancer    RESOLVED: Well adult exam     Other Visit Diagnoses         Encounter for screening mammogram for malignant neoplasm of breast    -  Primary    Relevant Orders    MA-SCREENING MAMMO BILAT W/TOMOSYNTHESIS W/CAD          Return in about 4 weeks (around 8/28/2025).      This chart was either fully or partly dictated using an electronic voice recognition software. The chart has been reviewed and edited but there is still possibility for dictation errors due to limitation of software     Mark Gray MD, PGY2         [1]   Current Outpatient Medications   Medication Sig Dispense Refill    Insulin Glargine-yfgn (SEMGLEE, YFGN,) 100 UNIT/ML Solution Pen-injector Inject 30 Units under the skin every evening. 30 mL 3    Semaglutide, 1 MG/DOSE, (OZEMPIC, 1 MG/DOSE,) 4 MG/3ML Solution Pen-injector Inject 2 mg under the skin every 7 days. 9 mL 3    fenofibrate (TRICOR) 145 MG Tab Take 1 Tablet by mouth every day. 100 Tablet 0     losartan (COZAAR) 100 MG Tab Take 1 Tablet by mouth every day. 100 Tablet 3    ezetimibe (ZETIA) 10 MG Tab Take 1 Tablet by mouth every day. 100 Tablet 3    hydroCHLOROthiazide 25 MG Tab Take 1 Tablet by mouth every day. 90 Tablet 3    ondansetron (ZOFRAN ODT) 4 MG TABLET DISPERSIBLE Take 1 Tablet by mouth every 8 hours as needed for Nausea/Vomiting. 10 Tablet 0    Empagliflozin (JARDIANCE) 25 MG Tab Take 1 Tablet by mouth every day. 90 Tablet 3    metFORMIN ER (GLUCOPHAGE XR) 500 MG TABLET SR 24 HR TAKE 4 TABLETS DAILY 360 Tablet 3    amLODIPine (NORVASC) 5 MG Tab Take 1 Tablet by mouth every day. 90 Tablet 3    Insulin Pen Needle (PEN NEEDLES) 32G X 4 MM Misc 1 Pen Needle every 7 days. 30 Each 1    Melatonin 10 MG Tab Take 20 mg by mouth at bedtime as needed.      aspirin EC (ECOTRIN) 325 MG TBEC Take 325 mg by mouth every day.      cholecalciferol (VITAMIN D) 400 UNIT CAPS Take 800 Units by mouth every day.      Red Yeast Rice 600 MG TABS Take  by mouth 2 Times a Day.      LORATADINE 10 mg every day. PRN / OTC      FISH OIL 1000 MG PO CAPS Take 4,000 mg by mouth every morning. FISH OIL /OTC       No current facility-administered medications for this visit.

## 2025-07-31 NOTE — ASSESSMENT & PLAN NOTE
Patient with previously well-controlled dyslipidemia.  Cannot tolerate statin.  Total cholesterol of 157 and LDL of 91 in March 2024.  Will recheck lipid panel.  - Continue current medication regimen

## 2025-08-04 ENCOUNTER — RESULTS FOLLOW-UP (OUTPATIENT)
Dept: MEDICAL GROUP | Facility: CLINIC | Age: 55
End: 2025-08-04
Payer: COMMERCIAL

## 2025-08-05 ENCOUNTER — HOSPITAL ENCOUNTER (OUTPATIENT)
Dept: RADIOLOGY | Facility: MEDICAL CENTER | Age: 55
End: 2025-08-05
Payer: COMMERCIAL

## 2025-08-05 DIAGNOSIS — Z12.31 ENCOUNTER FOR SCREENING MAMMOGRAM FOR MALIGNANT NEOPLASM OF BREAST: ICD-10-CM

## 2025-08-05 PROCEDURE — 77067 SCR MAMMO BI INCL CAD: CPT

## 2025-08-06 DIAGNOSIS — E11.9 TYPE 2 DIABETES MELLITUS WITHOUT COMPLICATION, WITH LONG-TERM CURRENT USE OF INSULIN (HCC): ICD-10-CM

## 2025-08-06 DIAGNOSIS — Z79.4 TYPE 2 DIABETES MELLITUS WITHOUT COMPLICATION, WITH LONG-TERM CURRENT USE OF INSULIN (HCC): ICD-10-CM

## 2025-08-06 RX ORDER — INSULIN GLARGINE-YFGN 100 [IU]/ML
30 INJECTION, SOLUTION SUBCUTANEOUS NIGHTLY
Qty: 99 ML | Refills: 3 | Status: SHIPPED
Start: 2025-08-06 | End: 2025-08-08

## 2025-08-06 RX ORDER — FENOFIBRATE 145 MG/1
145 TABLET, FILM COATED ORAL DAILY
Qty: 90 TABLET | Refills: 0 | Status: SHIPPED | OUTPATIENT
Start: 2025-08-06

## 2025-08-06 RX ORDER — PEN NEEDLE, DIABETIC 30 GX3/16"
1 NEEDLE, DISPOSABLE MISCELLANEOUS
Qty: 30 EACH | Refills: 1 | Status: SHIPPED | OUTPATIENT
Start: 2025-08-06

## 2025-08-06 RX ORDER — SEMAGLUTIDE 1.34 MG/ML
2 INJECTION, SOLUTION SUBCUTANEOUS
Qty: 3 ML | Refills: 3 | Status: SHIPPED | OUTPATIENT
Start: 2025-08-06

## 2025-08-06 RX ORDER — FENOFIBRATE 145 MG/1
145 TABLET, FILM COATED ORAL DAILY
Qty: 100 TABLET | Refills: 0 | OUTPATIENT
Start: 2025-08-06

## 2025-08-08 ENCOUNTER — TELEPHONE (OUTPATIENT)
Dept: MEDICAL GROUP | Facility: CLINIC | Age: 55
End: 2025-08-08
Payer: COMMERCIAL

## 2025-08-08 RX ORDER — INSULIN DETEMIR 100 [IU]/ML
30 INJECTION, SOLUTION SUBCUTANEOUS EVERY EVENING
Qty: 9 ML | Refills: 1 | Status: SHIPPED | OUTPATIENT
Start: 2025-08-08 | End: 2025-08-13 | Stop reason: SDUPTHER

## 2025-08-14 RX ORDER — INSULIN DETEMIR 100 [IU]/ML
30 INJECTION, SOLUTION SUBCUTANEOUS EVERY EVENING
Qty: 9 ML | Refills: 0 | Status: SHIPPED | OUTPATIENT
Start: 2025-08-14

## 2025-09-11 ENCOUNTER — APPOINTMENT (OUTPATIENT)
Dept: MEDICAL GROUP | Facility: CLINIC | Age: 55
End: 2025-09-11
Payer: COMMERCIAL